# Patient Record
Sex: FEMALE | Race: WHITE | NOT HISPANIC OR LATINO | Employment: FULL TIME | URBAN - METROPOLITAN AREA
[De-identification: names, ages, dates, MRNs, and addresses within clinical notes are randomized per-mention and may not be internally consistent; named-entity substitution may affect disease eponyms.]

---

## 2020-12-08 ENCOUNTER — HOSPITAL ENCOUNTER (EMERGENCY)
Facility: HOSPITAL | Age: 50
Discharge: HOME | End: 2020-12-08
Attending: EMERGENCY MEDICINE
Payer: COMMERCIAL

## 2020-12-08 ENCOUNTER — APPOINTMENT (EMERGENCY)
Dept: RADIOLOGY | Facility: HOSPITAL | Age: 50
End: 2020-12-08
Payer: COMMERCIAL

## 2020-12-08 VITALS
OXYGEN SATURATION: 99 % | DIASTOLIC BLOOD PRESSURE: 81 MMHG | SYSTOLIC BLOOD PRESSURE: 136 MMHG | TEMPERATURE: 97.6 F | HEIGHT: 63 IN | RESPIRATION RATE: 17 BRPM | HEART RATE: 81 BPM

## 2020-12-08 DIAGNOSIS — K22.2 ESOPHAGEAL OBSTRUCTION DUE TO FOOD IMPACTION: Primary | ICD-10-CM

## 2020-12-08 DIAGNOSIS — T18.128A ESOPHAGEAL OBSTRUCTION DUE TO FOOD IMPACTION: Primary | ICD-10-CM

## 2020-12-08 LAB
ATRIAL RATE: 77 BPM
P AXIS: 31 DEGREES
PR INTERVAL: 156 MS
QRS AXIS: 48 DEGREES
QRSD INTERVAL: 76 MS
QT INTERVAL: 373 MS
QTC INTERVAL: 420 MS
T WAVE AXIS: 35 DEGREES
VENTRICULAR RATE: 76 BPM

## 2020-12-08 PROCEDURE — 99284 EMERGENCY DEPT VISIT MOD MDM: CPT

## 2020-12-08 PROCEDURE — 99285 EMERGENCY DEPT VISIT HI MDM: CPT | Performed by: EMERGENCY MEDICINE

## 2020-12-08 PROCEDURE — 96374 THER/PROPH/DIAG INJ IV PUSH: CPT

## 2020-12-08 PROCEDURE — 93010 ELECTROCARDIOGRAM REPORT: CPT | Performed by: INTERNAL MEDICINE

## 2020-12-08 PROCEDURE — 71046 X-RAY EXAM CHEST 2 VIEWS: CPT

## 2020-12-08 PROCEDURE — 93005 ELECTROCARDIOGRAM TRACING: CPT

## 2020-12-08 RX ADMIN — GLUCAGON HYDROCHLORIDE 1 MG: KIT at 16:14

## 2021-01-04 ENCOUNTER — OFFICE VISIT (OUTPATIENT)
Dept: GASTROENTEROLOGY | Facility: CLINIC | Age: 51
End: 2021-01-04
Payer: COMMERCIAL

## 2021-01-04 VITALS
WEIGHT: 167 LBS | HEIGHT: 63 IN | DIASTOLIC BLOOD PRESSURE: 84 MMHG | SYSTOLIC BLOOD PRESSURE: 131 MMHG | BODY MASS INDEX: 29.59 KG/M2 | HEART RATE: 76 BPM | TEMPERATURE: 97.2 F

## 2021-01-04 DIAGNOSIS — R13.10 DYSPHAGIA, UNSPECIFIED TYPE: Primary | ICD-10-CM

## 2021-01-04 PROCEDURE — 99203 OFFICE O/P NEW LOW 30 MIN: CPT | Performed by: PHYSICIAN ASSISTANT

## 2021-01-04 NOTE — PROGRESS NOTES
[facility] - Outpatient Follow-up Note  Willadean Apley 52 y o  female MRN: [id number]  Encounter: [id number]          ASSESSMENT AND PLAN:      Diagnoses and all orders for this visit:    Dysphagia, unspecified type  -     EGD; Future    Other orders  -     Diet NPO; Sips with meds; Standing  -     Void on call to OR; Standing  -     Insert peripheral IV; Standing    Patient with intermittent episodes of dysphagia over several years  Will plan for EGD +/- dilation to evaluate for mechanical cause like web/ring/stricture or evidence of esophagitis  If EGD is normal, could consider barium swallow and esophageal manometry  Discussed plan with patient who is in agreement with this  All questions were answered  Will follow up after procedure  Patient is aware to return to ED with symptoms suggestive of obstruction including inability to swallow secretions  ______________________________________________________________________    Ashanti Solis is a 61-year-old female who presents to the GI office for follow up to an ER visit on 12/8/2020 for food bolus impaction  She reports a history of intermittent dysphagia with episodes every few years  Her last EGD with dilation was performed in 2015 by Dr Dwight Lopez at [facility]   She states she also had an episode of dysphagia with impaction 2 years ago which required emergent EGD for removal   She denies regular symptoms of dysphagia between these episodes  Her most recent episode on 12/8/2020 occurred while at work  She states she had eaten grapes over her break and 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 began to experience chest pain and shortness of breath  She felt pressure in the mid-esophagus  She went to the ED for evaluation  Chest X-ray was negative for acute process and remainder of workup was reportedly negative  Her symptoms ultimately resolved spontaneously and she did not require EGD    Since that time, she has been limited to a soft food or liquid diet as she fears a repeat episode  No reported fevers, chills, night sweats or weight loss  She denies heartburn or indigestion  She does not take any medications daily, and takes only a multivitamin  No further chest pain or shortness of breath  REVIEW OF SYSTEMS IS OTHERWISE NEGATIVE  Historical Information   Past Medical History:   Diagnosis Date    GERD (gastroesophageal reflux disease)     History of breast problem      Past Surgical History:   Procedure Laterality Date    BASAL CELL CARCINOMA EXCISION  2018    Dr Rj Womack (HISTORICAL)  2017    Mammo/U/S 11/2017 stable; next mammo 1 year    REVISION OF SCAR  09/2019    Excision of Painful scar Right retroauricular area - Dr Asher Perrin History   Social History     Substance and Sexual Activity   Alcohol Use Never    Frequency: Never     Social History     Substance and Sexual Activity   Drug Use Never     Social History     Tobacco Use   Smoking Status Never Smoker   Smokeless Tobacco Never Used     History reviewed  No pertinent family history  Meds/Allergies     No current outpatient medications on file  No Known Allergies        Objective     Blood pressure 131/84, pulse 76, temperature (!) 97 2 °F (36 2 °C), temperature source Tympanic, height 5' 3" (1 6 m), weight 75 8 kg (167 lb)  Body mass index is 29 58 kg/m²  PHYSICAL EXAM:      General Appearance:   Alert, cooperative, no distress   HEENT:   Normocephalic, atraumatic, sclera anicteric  Patient is wearing a mask over nose and mouth     Neck:  Supple, symmetrical, no lymphadenopathy, trachea midline   Lungs:   Clear to auscultation bilaterally; no rales, rhonchi or wheezing; respirations unlabored    Heart[de-identified]   Regular rate and rhythm; no murmur, rub, or gallop     Abdomen:   Soft, non-tender without rebound or guarding, non-distended; positive bowel sounds; no masses, no organomegaly    Genitalia:   Deferred    Rectal:   Deferred    Extremities:  No cyanosis, clubbing or edema    Pulses:  2+ and symmetric    Skin:  No jaundice, rashes, or lesions        Radiology Results:   Xr Chest 2 Views    Result Date: 12/8/2020  Impression: No acute cardiopulmonary disease   Workstation performed: [id number]       Tiffany Lucero PA-C

## 2021-01-04 NOTE — PATIENT INSTRUCTIONS
Egd on 1/25/21 with Dr Indu Gloria at [facility]  Prep instructions given to the patient in the office

## 2021-01-11 ENCOUNTER — ANESTHESIA EVENT (OUTPATIENT)
Dept: GASTROENTEROLOGY | Facility: AMBULARY SURGERY CENTER | Age: 51
End: 2021-01-11

## 2021-01-25 ENCOUNTER — HOSPITAL ENCOUNTER (OUTPATIENT)
Dept: GASTROENTEROLOGY | Facility: AMBULARY SURGERY CENTER | Age: 51
Setting detail: OUTPATIENT SURGERY
Discharge: HOME | End: 2021-01-25
Payer: COMMERCIAL

## 2021-01-25 ENCOUNTER — ANESTHESIA (OUTPATIENT)
Dept: GASTROENTEROLOGY | Facility: AMBULARY SURGERY CENTER | Age: 51
End: 2021-01-25

## 2021-01-25 VITALS
WEIGHT: 158 LBS | BODY MASS INDEX: 28 KG/M2 | HEART RATE: 73 BPM | SYSTOLIC BLOOD PRESSURE: 145 MMHG | RESPIRATION RATE: 16 BRPM | HEIGHT: 63 IN | OXYGEN SATURATION: 100 % | TEMPERATURE: 96.8 F | DIASTOLIC BLOOD PRESSURE: 77 MMHG

## 2021-01-25 VITALS — HEART RATE: 87 BPM

## 2021-01-25 DIAGNOSIS — R13.10 DYSPHAGIA, UNSPECIFIED TYPE: ICD-10-CM

## 2021-01-25 PROCEDURE — 88342 IMHCHEM/IMCYTCHM 1ST ANTB: CPT | Performed by: PATHOLOGY

## 2021-01-25 PROCEDURE — 88305 TISSUE EXAM BY PATHOLOGIST: CPT | Performed by: PATHOLOGY

## 2021-01-25 PROCEDURE — 43239 EGD BIOPSY SINGLE/MULTIPLE: CPT | Performed by: INTERNAL MEDICINE

## 2021-01-25 PROCEDURE — 88312 SPECIAL STAINS GROUP 1: CPT | Performed by: PATHOLOGY

## 2021-01-25 PROCEDURE — 43249 ESOPH EGD DILATION <30 MM: CPT | Performed by: INTERNAL MEDICINE

## 2021-01-25 PROCEDURE — C1726 CATH, BAL DIL, NON-VASCULAR: HCPCS

## 2021-01-25 RX ORDER — LIDOCAINE HYDROCHLORIDE 10 MG/ML
INJECTION, SOLUTION EPIDURAL; INFILTRATION; INTRACAUDAL; PERINEURAL AS NEEDED
Status: DISCONTINUED | OUTPATIENT
Start: 2021-01-25 | End: 2021-01-25

## 2021-01-25 RX ORDER — MULTIVITAMIN
1 TABLET ORAL DAILY
COMMUNITY

## 2021-01-25 RX ORDER — SODIUM CHLORIDE, SODIUM LACTATE, POTASSIUM CHLORIDE, CALCIUM CHLORIDE 600; 310; 30; 20 MG/100ML; MG/100ML; MG/100ML; MG/100ML
125 INJECTION, SOLUTION INTRAVENOUS CONTINUOUS
Status: DISCONTINUED | OUTPATIENT
Start: 2021-01-25 | End: 2021-01-29 | Stop reason: HOSPADM

## 2021-01-25 RX ORDER — KETAMINE HYDROCHLORIDE 50 MG/ML
INJECTION, SOLUTION, CONCENTRATE INTRAMUSCULAR; INTRAVENOUS AS NEEDED
Status: DISCONTINUED | OUTPATIENT
Start: 2021-01-25 | End: 2021-01-25

## 2021-01-25 RX ORDER — PROPOFOL 10 MG/ML
INJECTION, EMULSION INTRAVENOUS AS NEEDED
Status: DISCONTINUED | OUTPATIENT
Start: 2021-01-25 | End: 2021-01-25

## 2021-01-25 RX ADMIN — SODIUM CHLORIDE, SODIUM LACTATE, POTASSIUM CHLORIDE, AND CALCIUM CHLORIDE: .6; .31; .03; .02 INJECTION, SOLUTION INTRAVENOUS at 12:36

## 2021-01-25 RX ADMIN — PROPOFOL 120 MG: 10 INJECTION, EMULSION INTRAVENOUS at 12:41

## 2021-01-25 RX ADMIN — LIDOCAINE HYDROCHLORIDE 50 MG: 10 INJECTION, SOLUTION EPIDURAL; INFILTRATION; INTRACAUDAL at 12:41

## 2021-01-25 RX ADMIN — PROPOFOL 50 MG: 10 INJECTION, EMULSION INTRAVENOUS at 12:47

## 2021-01-25 RX ADMIN — PROPOFOL 40 MG: 10 INJECTION, EMULSION INTRAVENOUS at 12:44

## 2021-01-25 RX ADMIN — PROPOFOL 50 MG: 10 INJECTION, EMULSION INTRAVENOUS at 12:49

## 2021-01-25 RX ADMIN — KETAMINE HYDROCHLORIDE 20 MG: 50 INJECTION INTRAMUSCULAR; INTRAVENOUS at 12:47

## 2021-01-25 NOTE — ANESTHESIA PREPROCEDURE EVALUATION
Procedure:  EGD    Relevant Problems   GI/HEPATIC   (+) GERD (gastroesophageal reflux disease)        Physical Exam    Airway    Mallampati score: II  TM Distance: >3 FB  Neck ROM: full     Dental   Comment: Upper partial, upper dentures,     Cardiovascular  Cardiovascular exam normal    Pulmonary  Pulmonary exam normal     Other Findings        Anesthesia Plan  ASA Score- 2     Anesthesia Type- IV sedation with anesthesia with ASA Monitors  Additional Monitors:   Airway Plan:           Plan Factors-Exercise tolerance (METS): >4 METS  Chart reviewed  Patient is not a current smoker  Patient not instructed to abstain from smoking on day of procedure  Patient did not smoke on day of surgery  Induction- intravenous  Postoperative Plan-     Informed Consent- Anesthetic plan and risks discussed with patient  I personally reviewed this patient with the CRNA  Discussed and agreed on the Anesthesia Plan with the CRNA  Dillon Rouse

## 2021-01-25 NOTE — ANESTHESIA POSTPROCEDURE EVALUATION
Post-Op Assessment Note    CV Status:  Stable    Pain management: adequate     Mental Status:  Alert and awake   Hydration Status:  Euvolemic   PONV Controlled:  Controlled   Airway Patency:  Patent      Post Op Vitals Reviewed: Yes      Staff: CRNA         No complications documented      /76 (01/25/21 1300)    Temp 98 1 °F (36 7 °C) (01/25/21 1255)    Pulse 79 (01/25/21 1300)   Resp 16 (01/25/21 1300)    SpO2 99 % (01/25/21 1300)

## 2021-01-25 NOTE — PROGRESS NOTES
Pt  Currently on her menstrual cycle and unable to provide clean urine for urine pregnancy test  EGD to be done today  Per pt  Her  had vasectomy and there is no chance of pregnancy  Dr Brooke Wheat with anesthesia made aware  Was advised pt  Does not need pregnancy test for today's procedure

## 2021-01-25 NOTE — H&P
History and Physical -  [facility]  Jaime Barajas 52 y o  female MRN: [id number]                  HPI: Jaime Barajas is a 52y o  year old female who presents for EGD  REVIEW OF SYSTEMS: Per the HPI, and otherwise unremarkable  Historical Information   Past Medical History:   Diagnosis Date    GERD (gastroesophageal reflux disease)     History of breast problem      Past Surgical History:   Procedure Laterality Date    BASAL CELL CARCINOMA EXCISION  2018    Dr Dalila Nicholas (HISTORICAL)  2017    Mammo/U/S 11/2017 stable; next mammo 1 year    REVISION OF SCAR  09/2019    Excision of Painful scar Right retroauricular area - Dr Luann Oscar History   Social History     Substance and Sexual Activity   Alcohol Use Never    Frequency: Never     Social History     Substance and Sexual Activity   Drug Use Never     Social History     Tobacco Use   Smoking Status Never Smoker   Smokeless Tobacco Never Used     History reviewed  No pertinent family history  Meds/Allergies       Current Outpatient Medications:     Multiple Vitamin (multivitamin) tablet    Current Facility-Administered Medications:     lactated ringers infusion, 125 mL/hr, Intravenous, Continuous    No Known Allergies    Objective     /71   Pulse 73   Temp (!) 97 2 °F (36 2 °C) (Temporal)   Resp 18   Ht 5' 3" (1 6 m)   Wt 71 7 kg (158 lb)   LMP 01/25/2021   SpO2 99%   BMI 27 99 kg/m²       PHYSICAL EXAM    Gen: NAD  CV: RRR  CHEST: Clear  ABD: soft, NT/ND  EXT: no edema      ASSESSMENT/PLAN:  This is a 52y o  year old female here for EGD, and she is stable and optimized for her procedure

## 2021-01-29 ENCOUNTER — TELEPHONE (OUTPATIENT)
Dept: GASTROENTEROLOGY | Facility: CLINIC | Age: 51
End: 2021-01-29

## 2021-01-29 DIAGNOSIS — A04.8 HELICOBACTER PYLORI (H. PYLORI) INFECTION: Primary | ICD-10-CM

## 2021-01-29 RX ORDER — OMEPRAZOLE 40 MG/1
40 CAPSULE, DELAYED RELEASE ORAL 2 TIMES DAILY
Qty: 14 CAPSULE | Refills: 0 | Status: SHIPPED | OUTPATIENT
Start: 2021-01-29 | End: 2021-06-01

## 2021-01-29 RX ORDER — AMOXICILLIN 500 MG/1
1000 CAPSULE ORAL EVERY 12 HOURS SCHEDULED
Qty: 56 CAPSULE | Refills: 0 | Status: SHIPPED | OUTPATIENT
Start: 2021-01-29 | End: 2021-02-12

## 2021-01-29 NOTE — TELEPHONE ENCOUNTER
----- Message from Pamela Robles DO sent at 1/29/2021  1:53 PM EST -----  Please send pt a letter telling her to call us as her phone number is incorrect in our system and I need to give her the results of her EGD

## 2021-01-29 NOTE — RESULT ENCOUNTER NOTE
Please send pt a letter telling her to call us as her phone number is incorrect in our system and I need to give her the results of her EGD

## 2021-01-29 NOTE — TELEPHONE ENCOUNTER
I called and left  for Parth Son to have his wife give us a call and to update phone number, Dr Joanna Garcia was calling pt to go over results

## 2021-01-29 NOTE — RESULT ENCOUNTER NOTE
Discussed with pt that her biopsies show H pylori  I will call in a two week course of antibotics for this  She has no allergies  Will treat with triple therapy for 2 weeks and then have her get a h pylori stool test two weeks after that  Pt is aware  Sherita will you please get her office follow up or virtual visit in about one month so we can re-go over everything as I will also need to get her on treatment for eosinophilic esophagitis

## 2021-02-03 ENCOUNTER — TELEPHONE (OUTPATIENT)
Dept: GASTROENTEROLOGY | Facility: CLINIC | Age: 51
End: 2021-02-03

## 2021-02-03 NOTE — TELEPHONE ENCOUNTER
----- Message from Jailyn Fisher DO sent at 1/29/2021  3:23 PM EST -----  Discussed with pt that her biopsies show H pylori  I will call in a two week course of antibotics for this  She has no allergies  Will treat with triple therapy for 2 weeks and then have her get a h pylori stool test two weeks after that  Pt is aware  Sherita will you please get her office follow up or virtual visit in about one month so we can re-go over everything as I will also need to get her on treatment for eosinophilic esophagitis

## 2021-03-10 DIAGNOSIS — Z23 ENCOUNTER FOR IMMUNIZATION: ICD-10-CM

## 2021-03-26 ENCOUNTER — TELEMEDICINE (OUTPATIENT)
Dept: GASTROENTEROLOGY | Facility: CLINIC | Age: 51
End: 2021-03-26
Payer: COMMERCIAL

## 2021-03-26 DIAGNOSIS — B96.81 HELICOBACTER POSITIVE GASTRITIS: Primary | ICD-10-CM

## 2021-03-26 DIAGNOSIS — K20.0 EOSINOPHILIC ESOPHAGITIS: ICD-10-CM

## 2021-03-26 DIAGNOSIS — K21.9 GASTROESOPHAGEAL REFLUX DISEASE WITHOUT ESOPHAGITIS: ICD-10-CM

## 2021-03-26 DIAGNOSIS — K29.70 HELICOBACTER POSITIVE GASTRITIS: Primary | ICD-10-CM

## 2021-03-26 PROCEDURE — 99214 OFFICE O/P EST MOD 30 MIN: CPT | Performed by: INTERNAL MEDICINE

## 2021-03-26 RX ORDER — ESOMEPRAZOLE MAGNESIUM 40 MG/1
40 FOR SUSPENSION ORAL
Qty: 60 EACH | Refills: 3 | Status: SHIPPED | OUTPATIENT
Start: 2021-03-26

## 2021-03-26 NOTE — PROGRESS NOTES
Virtual Regular Visit      Assessment/Plan:    Problem List Items Addressed This Visit        Digestive    GERD (gastroesophageal reflux disease)      Other Visit Diagnoses     Helicobacter positive gastritis    -  Primary    Eosinophilic esophagitis            1   H pylori gastritis  -  She completed treatment,  I recommend she check the stool antigen after holding her PPI for 2 weeks  2    Eosinophilic esophagitis  - will repeat EGD in 3 months after     4 months follow-up       Reason for visit is   Chief Complaint   Patient presents with    Virtual Regular Visit        Encounter provider Silvano Russell DO    Provider located at [address] [place] [address] 1  [address] [address]  [phone]      Recent Visits  No visits were found meeting these conditions  Showing recent visits within past 7 days and meeting all other requirements     Future Appointments  No visits were found meeting these conditions  Showing future appointments within next 150 days and meeting all other requirements      The patient was identified by name and date of birth  Layla Greene was informed that this is a telemedicine visit and that the visit is being conducted through telephone  My office door was closed  No one else was in the room  She acknowledged consent and understanding of privacy and security of the video platform  The patient has agreed to participate and understands they can discontinue the visit at any time  Patient is aware this is a billable service  Subjective  Layla Greene is a 54 y o  female here for follow up of H pylori gastritis  She also has new diagnosis of EoE and is on prilosec 40 mg bid  Swallowing is much improved currently              Past Medical History:   Diagnosis Date    GERD (gastroesophageal reflux disease)     History of breast problem        Past Surgical History:   Procedure Laterality Date    BASAL CELL CARCINOMA EXCISION  2018    Dr Gil Franklin (HISTORICAL)  2017    Mammo/U/S 11/2017 stable; next mammo 1 year    REVISION OF SCAR  09/2019    Excision of Painful scar Right retroauricular area - Dr Marlee Reyes         Current Outpatient Medications   Medication Sig Dispense Refill    Multiple Vitamin (multivitamin) tablet Take 1 tablet by mouth daily      omeprazole (PriLOSEC) 40 MG capsule Take 1 capsule (40 mg total) by mouth 2 (two) times a day 14 capsule 0     No current facility-administered medications for this visit  No Known Allergies    REVIEW OF SYSTEMS:    CONSTITUTIONAL: Denies any fever, chills, rigors, and weight loss  HEENT: No earache or tinnitus  Denies hearing loss or visual disturbances  CARDIOVASCULAR: No chest pain or palpitations  RESPIRATORY: Denies any cough, hemoptysis, shortness of breath or dyspnea on exertion  GASTROINTESTINAL: As noted in the History of Present Illness  GENITOURINARY: No problems with urination  Denies any hematuria or dysuria  NEUROLOGIC: No dizziness or vertigo, denies headaches  MUSCULOSKELETAL: Denies any muscle or joint pain  SKIN: Denies skin rashes or itching  ENDOCRINE: Denies excessive thirst  Denies intolerance to heat or cold  PSYCHOSOCIAL: Denies depression or anxiety  Denies any recent memory loss  PHYSICAL EXAMINATION:    General Appearance:   Not done   HEENT:     Lungs:      Cardiovascular:      Abdomen:      Skin:      Musculoskeletal:      Psych:     Neuro: I spent 7 minutes directly with the patient during this visit      VIRTUAL VISIT DISCLAIMER    Luis Felipe Sweeney acknowledges that she has consented to an online visit or consultation   She understands that the online visit is based solely on information provided by her, and that, in the absence of a face-to-face physical evaluation by the physician, the diagnosis she receives is both limited and provisional in terms of accuracy and completeness  This is not intended to replace a full medical face-to-face evaluation by the physician  Do Wesley understands and accepts these terms

## 2021-03-29 DIAGNOSIS — Z12.11 COLON CANCER SCREENING: Primary | ICD-10-CM

## 2021-03-30 ENCOUNTER — PREP FOR PROCEDURE (OUTPATIENT)
Dept: GASTROENTEROLOGY | Facility: CLINIC | Age: 51
End: 2021-03-30

## 2021-03-30 DIAGNOSIS — Z12.11 COLON CANCER SCREENING: Primary | ICD-10-CM

## 2021-03-30 RX ORDER — SODIUM, POTASSIUM,MAG SULFATES 17.5-3.13G
SOLUTION, RECONSTITUTED, ORAL ORAL
Qty: 366 ML | Refills: 0 | Status: SHIPPED | OUTPATIENT
Start: 2021-03-30

## 2021-03-30 NOTE — TELEPHONE ENCOUNTER
Colon/egd on 6/1/21 with Dr Ayoub Or at [facility]  While speaking to patient stated her family doctor has ordered a colonoscopy  Asked the OA questions and were all no  Suprep instructions gone over verbally and mailed to the patient

## 2021-04-27 ENCOUNTER — TRANSCRIBE ORDERS (OUTPATIENT)
Dept: LAB | Facility: CLINIC | Age: 51
End: 2021-04-27

## 2021-05-05 ENCOUNTER — APPOINTMENT (OUTPATIENT)
Dept: LAB | Facility: CLINIC | Age: 51
End: 2021-05-05
Payer: COMMERCIAL

## 2021-05-05 DIAGNOSIS — A04.8 HELICOBACTER PYLORI (H. PYLORI) INFECTION: ICD-10-CM

## 2021-05-05 PROCEDURE — 87338 HPYLORI STOOL AG IA: CPT

## 2021-05-07 LAB — H PYLORI AG STL QL IA: POSITIVE

## 2021-05-11 ENCOUNTER — TELEPHONE (OUTPATIENT)
Dept: GASTROENTEROLOGY | Facility: CLINIC | Age: 51
End: 2021-05-11

## 2021-05-11 DIAGNOSIS — A04.8 HELICOBACTER PYLORI (H. PYLORI) INFECTION: Primary | ICD-10-CM

## 2021-05-11 RX ORDER — METRONIDAZOLE 250 MG/1
250 TABLET ORAL
Qty: 56 TABLET | Refills: 0 | Status: SHIPPED | OUTPATIENT
Start: 2021-05-11 | End: 2021-05-25

## 2021-05-11 RX ORDER — OMEPRAZOLE 40 MG/1
40 CAPSULE, DELAYED RELEASE ORAL
Qty: 28 CAPSULE | Refills: 0 | Status: SHIPPED | OUTPATIENT
Start: 2021-05-11 | End: 2021-06-01

## 2021-05-11 RX ORDER — BISMUTH SUBSALICYLATE 262 MG/1
524 TABLET, CHEWABLE ORAL
Qty: 112 TABLET | Refills: 0 | Status: SHIPPED | OUTPATIENT
Start: 2021-05-11 | End: 2021-05-25

## 2021-05-11 RX ORDER — TETRACYCLINE HYDROCHLORIDE 500 MG/1
500 CAPSULE ORAL
Qty: 56 CAPSULE | Refills: 0 | Status: SHIPPED | OUTPATIENT
Start: 2021-05-11 | End: 2021-05-25

## 2021-05-11 NOTE — TELEPHONE ENCOUNTER
----- Message from Willy Lennox, PA-C sent at 5/10/2021  4:55 PM EDT -----  Kelton Magallon, I called and left her a VM  It looks like she was treated with amoxicillin, clarithromycin, and omeprazole the 1st time, but she failed treatment  She does not appear to have allergies  If you can confirm this, I will order quadruple therapy (metronidazole, tetracycline, pepto, and omeprazole x 2 weeks)  Thanks, just let me know

## 2021-05-28 ENCOUNTER — TELEPHONE (OUTPATIENT)
Dept: GASTROENTEROLOGY | Facility: AMBULARY SURGERY CENTER | Age: 51
End: 2021-05-28

## 2021-06-01 ENCOUNTER — HOSPITAL ENCOUNTER (OUTPATIENT)
Dept: GASTROENTEROLOGY | Facility: AMBULARY SURGERY CENTER | Age: 51
Setting detail: OUTPATIENT SURGERY
Discharge: HOME | End: 2021-06-01
Attending: INTERNAL MEDICINE | Admitting: INTERNAL MEDICINE
Payer: COMMERCIAL

## 2021-06-01 ENCOUNTER — ANESTHESIA EVENT (OUTPATIENT)
Dept: GASTROENTEROLOGY | Facility: AMBULARY SURGERY CENTER | Age: 51
End: 2021-06-01

## 2021-06-01 ENCOUNTER — ANESTHESIA EVENT (OUTPATIENT)
Dept: ANESTHESIOLOGY | Facility: HOSPITAL | Age: 51
End: 2021-06-01

## 2021-06-01 ENCOUNTER — ANESTHESIA (OUTPATIENT)
Dept: ANESTHESIOLOGY | Facility: HOSPITAL | Age: 51
End: 2021-06-01

## 2021-06-01 ENCOUNTER — ANESTHESIA (OUTPATIENT)
Dept: GASTROENTEROLOGY | Facility: AMBULARY SURGERY CENTER | Age: 51
End: 2021-06-01

## 2021-06-01 VITALS
DIASTOLIC BLOOD PRESSURE: 62 MMHG | HEART RATE: 79 BPM | SYSTOLIC BLOOD PRESSURE: 143 MMHG | TEMPERATURE: 96.5 F | RESPIRATION RATE: 18 BRPM | HEIGHT: 63 IN | BODY MASS INDEX: 28 KG/M2 | OXYGEN SATURATION: 99 % | WEIGHT: 158 LBS

## 2021-06-01 DIAGNOSIS — Z12.11 COLON CANCER SCREENING: ICD-10-CM

## 2021-06-01 DIAGNOSIS — K20.0 EOSINOPHILIC ESOPHAGITIS: ICD-10-CM

## 2021-06-01 PROCEDURE — 88342 IMHCHEM/IMCYTCHM 1ST ANTB: CPT | Performed by: PATHOLOGY

## 2021-06-01 PROCEDURE — G0121 COLON CA SCRN NOT HI RSK IND: HCPCS | Performed by: INTERNAL MEDICINE

## 2021-06-01 PROCEDURE — 43239 EGD BIOPSY SINGLE/MULTIPLE: CPT | Performed by: INTERNAL MEDICINE

## 2021-06-01 PROCEDURE — 88305 TISSUE EXAM BY PATHOLOGIST: CPT | Performed by: PATHOLOGY

## 2021-06-01 RX ORDER — PROPOFOL 10 MG/ML
INJECTION, EMULSION INTRAVENOUS AS NEEDED
Status: DISCONTINUED | OUTPATIENT
Start: 2021-06-01 | End: 2021-06-01

## 2021-06-01 RX ORDER — SODIUM CHLORIDE, SODIUM LACTATE, POTASSIUM CHLORIDE, CALCIUM CHLORIDE 600; 310; 30; 20 MG/100ML; MG/100ML; MG/100ML; MG/100ML
50 INJECTION, SOLUTION INTRAVENOUS CONTINUOUS
Status: CANCELLED | OUTPATIENT
Start: 2021-06-01

## 2021-06-01 RX ORDER — LIDOCAINE HYDROCHLORIDE 10 MG/ML
INJECTION, SOLUTION EPIDURAL; INFILTRATION; INTRACAUDAL; PERINEURAL AS NEEDED
Status: DISCONTINUED | OUTPATIENT
Start: 2021-06-01 | End: 2021-06-01

## 2021-06-01 RX ORDER — SODIUM CHLORIDE, SODIUM LACTATE, POTASSIUM CHLORIDE, CALCIUM CHLORIDE 600; 310; 30; 20 MG/100ML; MG/100ML; MG/100ML; MG/100ML
INJECTION, SOLUTION INTRAVENOUS CONTINUOUS PRN
Status: DISCONTINUED | OUTPATIENT
Start: 2021-06-01 | End: 2021-06-01

## 2021-06-01 RX ADMIN — SODIUM CHLORIDE, SODIUM LACTATE, POTASSIUM CHLORIDE, AND CALCIUM CHLORIDE: .6; .31; .03; .02 INJECTION, SOLUTION INTRAVENOUS at 08:34

## 2021-06-01 RX ADMIN — PROPOFOL 120 MG: 10 INJECTION, EMULSION INTRAVENOUS at 09:52

## 2021-06-01 RX ADMIN — PROPOFOL 50 MG: 10 INJECTION, EMULSION INTRAVENOUS at 10:02

## 2021-06-01 RX ADMIN — PROPOFOL 50 MG: 10 INJECTION, EMULSION INTRAVENOUS at 09:57

## 2021-06-01 RX ADMIN — PROPOFOL 50 MG: 10 INJECTION, EMULSION INTRAVENOUS at 09:59

## 2021-06-01 RX ADMIN — PROPOFOL 100 MG: 10 INJECTION, EMULSION INTRAVENOUS at 09:56

## 2021-06-01 RX ADMIN — PROPOFOL 100 MG: 10 INJECTION, EMULSION INTRAVENOUS at 10:07

## 2021-06-01 RX ADMIN — PROPOFOL 80 MG: 10 INJECTION, EMULSION INTRAVENOUS at 09:54

## 2021-06-01 RX ADMIN — LIDOCAINE HYDROCHLORIDE 50 MG: 10 INJECTION, SOLUTION EPIDURAL; INFILTRATION; INTRACAUDAL at 09:52

## 2021-06-01 RX ADMIN — PROPOFOL 50 MG: 10 INJECTION, EMULSION INTRAVENOUS at 10:05

## 2021-06-01 NOTE — H&P
History and Physical -  [facility]  Cj Lantigua 54 y o  female MRN: [id number]                  HPI: Cj Lantigua is a 54y o  year old female who presents for follow-up EGD for diagnosis of eosinophilic esophagitis as well as H pylori gastritis  Colonoscopy for screening  REVIEW OF SYSTEMS: Per the HPI, and otherwise unremarkable  Historical Information   Past Medical History:   Diagnosis Date    GERD (gastroesophageal reflux disease)     History of breast problem      Past Surgical History:   Procedure Laterality Date    BASAL CELL CARCINOMA EXCISION  2018    Dr Dexter Stack (HISTORICAL)  2017    Mammo/U/S 11/2017 stable; next mammo 1 year    REVISION OF SCAR  09/2019    Excision of Painful scar Right retroauricular area - Dr Barton Shock History   Social History     Substance and Sexual Activity   Alcohol Use Never    Frequency: Never     Social History     Substance and Sexual Activity   Drug Use Never     Social History     Tobacco Use   Smoking Status Never Smoker   Smokeless Tobacco Never Used     History reviewed  No pertinent family history  Meds/Allergies       Current Outpatient Medications:     esomeprazole (NexIUM) 40 mg packet    Multiple Vitamin (multivitamin) tablet    Na Sulfate-K Sulfate-Mg Sulf (Suprep Bowel Prep Kit) 17 5-3 13-1 6 GM/177ML SOLN    No Known Allergies    Objective     /70   Pulse 78 Comment: NSR  Temp (!) 96 4 °F (35 8 °C) (Temporal)   Resp 18   Ht 5' 3" (1 6 m)   Wt 71 7 kg (158 lb)   LMP 05/19/2021 (Approximate)   SpO2 100%   BMI 27 99 kg/m²       PHYSICAL EXAM    Gen: NAD  Head: NCAT  CV: RRR  CHEST: Clear  ABD: soft, NT/ND  EXT: no edema      ASSESSMENT/PLAN:  This is a 54y o  year old female here for EGD and colonoscopy, and she is stable and optimized for her procedure

## 2021-06-01 NOTE — ANESTHESIA PREPROCEDURE EVALUATION
Procedure:  PRE-OP ONLY    Relevant Problems   GI/HEPATIC   (+) GERD (gastroesophageal reflux disease)      EKG 12/8/2020:  Sinus rhythm    No results found for: WBC, HGB, HCT, MCV, PLT  No results found for: SODIUM, K, CL, CO2, BUN, CREATININE, GLUC, CALCIUM  No results found for: INR, PROTIME  Lab Results   Component Value Date    HGBA1C 5 2 01/04/2020               Anesthesia Plan  ASA Score- 2     Anesthesia Type- IV sedation with anesthesia with ASA Monitors  Additional Monitors:   Airway Plan:           Plan Factors-    Chart reviewed  EKG reviewed  Patient summary reviewed  Induction- intravenous      Postoperative Plan-     Informed Consent-

## 2021-06-01 NOTE — ANESTHESIA POSTPROCEDURE EVALUATION
Post-Op Assessment Note    CV Status:  Stable  Pain Score: 0    Pain management: adequate     Mental Status:  Alert and awake   Hydration Status:  Euvolemic   PONV Controlled:  Controlled   Airway Patency:  Patent      Post Op Vitals Reviewed: Yes      Staff: CRNA         No complications documented      BP   105/64   Temp     Pulse 80   Resp 16   SpO2 100

## 2021-06-01 NOTE — ANESTHESIA PREPROCEDURE EVALUATION
Procedure:  EGD  COLONOSCOPY    Relevant Problems   ANESTHESIA (within normal limits)      CARDIO (within normal limits)      ENDO (within normal limits)      GI/HEPATIC   (+) GERD (gastroesophageal reflux disease)      /RENAL (within normal limits)      HEMATOLOGY (within normal limits)      NEURO/PSYCH (within normal limits)      PULMONARY (within normal limits)      EKG 12/8/2020:  Sinus rhythm    No results found for: WBC, HGB, HCT, MCV, PLT  No results found for: SODIUM, K, CL, CO2, BUN, CREATININE, GLUC, CALCIUM  No results found for: INR, PROTIME  Lab Results   Component Value Date    HGBA1C 5 2 01/04/2020          Physical Exam    Airway    Mallampati score: II  TM Distance: >3 FB  Neck ROM: full     Dental   upper dentures,     Cardiovascular  Cardiovascular exam normal    Pulmonary  Pulmonary exam normal     Other Findings        Anesthesia Plan  ASA Score- 2     Anesthesia Type- IV sedation with anesthesia with ASA Monitors  Additional Monitors:   Airway Plan:           Plan Factors-Exercise tolerance (METS): >4 METS  Chart reviewed  EKG reviewed  Patient summary reviewed  Induction- intravenous  Postoperative Plan-     Informed Consent- Anesthetic plan and risks discussed with patient  I personally reviewed this patient with the CRNA  Discussed and agreed on the Anesthesia Plan with the CRNA  Amelia Eli

## 2021-06-16 ENCOUNTER — TELEPHONE (OUTPATIENT)
Dept: GASTROENTEROLOGY | Facility: CLINIC | Age: 51
End: 2021-06-16

## 2021-06-16 DIAGNOSIS — K20.0 EOSINOPHILIC ESOPHAGITIS: Primary | ICD-10-CM

## 2021-06-16 NOTE — TELEPHONE ENCOUNTER
I called pt  There was no answer so I left a message  She still has increased eosinophils in her esophagus so whatever medicine she is on we should switch it  Please reach out to patient and see if she is taking high-dose PPI?

## 2021-06-18 RX ORDER — FLUTICASONE PROPIONATE 220 UG/1
AEROSOL, METERED RESPIRATORY (INHALATION)
Qty: 12 G | Refills: 1 | Status: SHIPPED | OUTPATIENT
Start: 2021-06-18

## 2021-06-18 NOTE — TELEPHONE ENCOUNTER
Called and reviewed recommendations with patient  We reviewed inhaler instructions, proper oral care, avoiding PO intake for 30-60 minutes after administration   Patient verbalized understanding of instructions

## 2021-06-18 NOTE — TELEPHONE ENCOUNTER
Since the high-dose PPI is not eradicating her EOE and she says she cannot tolerate 'large pills', I'm sendig in fluticasone inhaler (inhale and swallow regimen) for 8 weeks as this is the second-line treatment  Thanks!

## 2021-06-18 NOTE — TELEPHONE ENCOUNTER
Patient called back  Advised her of increased eosinophils  She is taking nexium 40 mg BID  I advised her we needed to verify this since she has the increased eosinophil count, which means it is not working  She is questioning which medication she will be switched to  She was taking nexium powder packets because she has a difficult time swallowing large pills  I advised I am not sure the size capsules that different PPI's come in, but I believe they are not usually large pills  Patient verbalized understanding   Please send preferred PPI to pharmacy on file

## 2022-06-04 ENCOUNTER — HOSPITAL ENCOUNTER (EMERGENCY)
Facility: HOSPITAL | Age: 52
Discharge: HOME | End: 2022-06-04
Attending: SURGERY
Payer: COMMERCIAL

## 2022-06-04 ENCOUNTER — APPOINTMENT (EMERGENCY)
Dept: CT IMAGING | Facility: HOSPITAL | Age: 52
End: 2022-06-04
Payer: COMMERCIAL

## 2022-06-04 VITALS
HEART RATE: 82 BPM | TEMPERATURE: 97.6 F | WEIGHT: 168.87 LBS | RESPIRATION RATE: 16 BRPM | SYSTOLIC BLOOD PRESSURE: 130 MMHG | OXYGEN SATURATION: 98 % | DIASTOLIC BLOOD PRESSURE: 70 MMHG

## 2022-06-04 DIAGNOSIS — S20.211A CHEST WALL CONTUSION, RIGHT, INITIAL ENCOUNTER: ICD-10-CM
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
## 2022-06-04 PROBLEM — R55 SYNCOPE: Status: ACTIVE | Noted: 2022-06-04

## 2022-06-04 LAB
ABO GROUP BLD: NORMAL
ANION GAP SERPL CALCULATED.3IONS-SCNC: 7 MMOL/L (ref 4–13)
BASE EXCESS BLDA CALC-SCNC: -5 MMOL/L (ref -2–3)
BUN SERPL-MCNC: 10 MG/DL (ref 5–25)
CA-I BLD-SCNC: 0.87 MMOL/L (ref 1.12–1.32)
CALCIUM SERPL-MCNC: 8.5 MG/DL (ref 8.4–10.2)
CHLORIDE SERPL-SCNC: 107 MMOL/L (ref 96–108)
CO2 SERPL-SCNC: 24 MMOL/L (ref 21–32)
CREAT SERPL-MCNC: 0.75 MG/DL (ref 0.6–1.3)
ERYTHROCYTE [DISTWIDTH] IN BLOOD BY AUTOMATED COUNT: 12.7 % (ref 11.6–15.1)
GFR SERPL CREATININE-BSD FRML MDRD: 93 ML/MIN/1.73SQ M
GLUCOSE SERPL-MCNC: 79 MG/DL (ref 65–140)
GLUCOSE SERPL-MCNC: 99 MG/DL (ref 65–140)
HCO3 BLDA-SCNC: 17.9 MMOL/L (ref 24–30)
HCT VFR BLD AUTO: 39.4 % (ref 34.8–46.1)
HCT VFR BLD CALC: 28 % (ref 34.8–46.1)
HGB BLD-MCNC: 13 G/DL (ref 11.5–15.4)
HGB BLDA-MCNC: 9.5 G/DL (ref 11.5–15.4)
MCH RBC QN AUTO: 30 PG (ref 26.8–34.3)
MCHC RBC AUTO-ENTMCNC: 33 G/DL (ref 31.4–37.4)
MCV RBC AUTO: 91 FL (ref 82–98)
PCO2 BLD: 19 MMOL/L (ref 21–32)
PCO2 BLD: 24.7 MM HG (ref 42–50)
PH BLD: 7.47 [PH] (ref 7.3–7.4)
PLATELET # BLD AUTO: 320 THOUSANDS/UL (ref 149–390)
PMV BLD AUTO: 10.8 FL (ref 8.9–12.7)
PO2 BLD: 53 MM HG (ref 35–45)
POTASSIUM BLD-SCNC: 3.1 MMOL/L (ref 3.5–5.3)
POTASSIUM SERPL-SCNC: 3.8 MMOL/L (ref 3.5–5.3)
RBC # BLD AUTO: 4.34 MILLION/UL (ref 3.81–5.12)
RH BLD: POSITIVE
SAO2 % BLD FROM PO2: 90 % (ref 60–85)
SODIUM BLD-SCNC: 145 MMOL/L (ref 136–145)
SODIUM SERPL-SCNC: 138 MMOL/L (ref 135–147)
SPECIMEN SOURCE: ABNORMAL
WBC # BLD AUTO: 7.38 THOUSAND/UL (ref 4.31–10.16)

## 2022-06-04 PROCEDURE — 72125 CT NECK SPINE W/O DYE: CPT

## 2022-06-04 PROCEDURE — 70450 CT HEAD/BRAIN W/O DYE: CPT

## 2022-06-04 PROCEDURE — 80048 BASIC METABOLIC PNL TOTAL CA: CPT | Performed by: PHYSICIAN ASSISTANT

## 2022-06-04 PROCEDURE — 84132 ASSAY OF SERUM POTASSIUM: CPT

## 2022-06-04 PROCEDURE — 99285 EMERGENCY DEPT VISIT HI MDM: CPT

## 2022-06-04 PROCEDURE — 76705 ECHO EXAM OF ABDOMEN: CPT | Performed by: PHYSICIAN ASSISTANT

## 2022-06-04 PROCEDURE — 71250 CT THORAX DX C-: CPT

## 2022-06-04 PROCEDURE — 82947 ASSAY GLUCOSE BLOOD QUANT: CPT

## 2022-06-04 PROCEDURE — 76604 US EXAM CHEST: CPT | Performed by: PHYSICIAN ASSISTANT

## 2022-06-04 PROCEDURE — 82803 BLOOD GASES ANY COMBINATION: CPT

## 2022-06-04 PROCEDURE — 93308 TTE F-UP OR LMTD: CPT | Performed by: PHYSICIAN ASSISTANT

## 2022-06-04 PROCEDURE — 82330 ASSAY OF CALCIUM: CPT

## 2022-06-04 PROCEDURE — 96365 THER/PROPH/DIAG IV INF INIT: CPT

## 2022-06-04 PROCEDURE — NC001 PR NO CHARGE: Performed by: EMERGENCY MEDICINE

## 2022-06-04 PROCEDURE — 84295 ASSAY OF SERUM SODIUM: CPT

## 2022-06-04 PROCEDURE — 85014 HEMATOCRIT: CPT

## 2022-06-04 PROCEDURE — 74176 CT ABD & PELVIS W/O CONTRAST: CPT

## 2022-06-04 PROCEDURE — 36415 COLL VENOUS BLD VENIPUNCTURE: CPT | Performed by: SURGERY

## 2022-06-04 PROCEDURE — 99204 OFFICE O/P NEW MOD 45 MIN: CPT | Performed by: SURGERY

## 2022-06-04 PROCEDURE — 85027 COMPLETE CBC AUTOMATED: CPT | Performed by: PHYSICIAN ASSISTANT

## 2022-06-04 RX ORDER — IBUPROFEN 200 MG
400 TABLET ORAL EVERY 6 HOURS PRN
Qty: 30 TABLET | Refills: 0
Start: 2022-06-04 | End: 2022-07-04

## 2022-06-04 RX ORDER — ONDANSETRON 2 MG/ML
4 INJECTION INTRAMUSCULAR; INTRAVENOUS EVERY 4 HOURS PRN
Status: DISCONTINUED | OUTPATIENT
Start: 2022-06-04 | End: 2022-06-04 | Stop reason: HOSPADM

## 2022-06-04 RX ORDER — ACETAMINOPHEN 325 MG/1
650 TABLET ORAL EVERY 4 HOURS PRN
Status: DISCONTINUED | OUTPATIENT
Start: 2022-06-04 | End: 2022-06-04 | Stop reason: HOSPADM

## 2022-06-04 RX ORDER — ACETAMINOPHEN 325 MG/1
650 TABLET ORAL EVERY 4 HOURS PRN
Refills: 0
Start: 2022-06-04

## 2022-06-04 RX ORDER — OXYCODONE HYDROCHLORIDE 5 MG/1
2.5 TABLET ORAL EVERY 4 HOURS PRN
Status: DISCONTINUED | OUTPATIENT
Start: 2022-06-04 | End: 2022-06-04 | Stop reason: HOSPADM

## 2022-06-04 RX ORDER — SODIUM CHLORIDE, SODIUM GLUCONATE, SODIUM ACETATE, POTASSIUM CHLORIDE, MAGNESIUM CHLORIDE, SODIUM PHOSPHATE, DIBASIC, AND POTASSIUM PHOSPHATE .53; .5; .37; .037; .03; .012; .00082 G/100ML; G/100ML; G/100ML; G/100ML; G/100ML; G/100ML; G/100ML
1000 INJECTION, SOLUTION INTRAVENOUS ONCE
Status: COMPLETED | OUTPATIENT
Start: 2022-06-04 | End: 2022-06-04

## 2022-06-04 RX ORDER — OXYCODONE HYDROCHLORIDE 5 MG/1
5 TABLET ORAL EVERY 4 HOURS PRN
Status: DISCONTINUED | OUTPATIENT
Start: 2022-06-04 | End: 2022-06-04 | Stop reason: HOSPADM

## 2022-06-04 RX ADMIN — SODIUM CHLORIDE, SODIUM GLUCONATE, SODIUM ACETATE, POTASSIUM CHLORIDE, MAGNESIUM CHLORIDE, SODIUM PHOSPHATE, DIBASIC, AND POTASSIUM PHOSPHATE 1000 ML: .53; .5; .37; .037; .03; .012; .00082 INJECTION, SOLUTION INTRAVENOUS at 12:37

## 2022-06-04 NOTE — QUICK NOTE
Cervical Collar Clearance: The patient had a CT scan of the cervical spine demonstrating no acute injury  On exam, the patient had no midline point tenderness or paresthesias/numbness/weakness in the extremities  The patient had full range of motion (was then able to flex, extend, and rotate head laterally) without pain  There were no distracting injuries and the patient was not intoxicated  The patient's cervical spine was cleared radiologically and clinically  Cervical collar removed at this time       Janusz Pickard PA-C  6/4/2022 01:06 PM
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
## 2022-06-04 NOTE — INCIDENTAL FINDINGS
The following findings require follow up:  Radiographic finding     Findings and Follow up required:       1) Borderline ectatic (enlarged) ascending aorta measuring 4 cm  Recommendation is follow-up with primary care provider to review the finding and for surveillance with repeat noncontrast CT scan of the chest in 1 year  2)  Tiny calcified right middle lobe granuloma  A malignancy (cancer) is unlikely, but cannot be completely excluded based on this trauma imaging  Recommend outpatient follow-up with primary care provider to review the finding and for surveillance or further workup as indicated  I personally discussed the above-noted incidental findings with the patient who expressed understanding of the findings and the recommended follow-up  Follow up should be done within 2 week(s)    Please notify the following clinician to assist with the follow up:    Ester Larkin PA-C
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
## 2022-06-07 ENCOUNTER — TELEPHONE (OUTPATIENT)
Dept: OTHER | Facility: OTHER | Age: 52
End: 2022-06-07

## 2022-06-07 NOTE — TELEPHONE ENCOUNTER
Patient's spouse called in this evening requesting patient's imaging records be sent to PCP [facility])  Advised patient to contact medical records on 6/8 to get consent form completed and returned to send information to PCP  Patient's My Chart is set up on other account, so another My Chart account could not be set up for patient to access test results
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
## 2023-07-17 ENCOUNTER — APPOINTMENT (EMERGENCY)
Dept: RADIOLOGY | Facility: HOSPITAL | Age: 53
End: 2023-07-17
Payer: COMMERCIAL

## 2023-07-17 ENCOUNTER — HOSPITAL ENCOUNTER (EMERGENCY)
Facility: HOSPITAL | Age: 53
Discharge: HOME | End: 2023-07-17
Attending: EMERGENCY MEDICINE | Admitting: EMERGENCY MEDICINE
Payer: COMMERCIAL

## 2023-07-17 VITALS
WEIGHT: 182.54 LBS | BODY MASS INDEX: 32.34 KG/M2 | OXYGEN SATURATION: 99 % | HEART RATE: 65 BPM | SYSTOLIC BLOOD PRESSURE: 117 MMHG | RESPIRATION RATE: 16 BRPM | TEMPERATURE: 97.5 F | DIASTOLIC BLOOD PRESSURE: 66 MMHG

## 2023-07-17 DIAGNOSIS — R07.9 CHEST PAIN: Primary | ICD-10-CM

## 2023-07-17 PROBLEM — I71.9 AORTIC ANEURYSM (HCC): Status: ACTIVE | Noted: 2023-07-17

## 2023-07-17 LAB
2HR DELTA HS TROPONIN: >0 NG/L
ALBUMIN SERPL BCP-MCNC: 4.2 G/DL (ref 3.5–5)
ALP SERPL-CCNC: 66 U/L (ref 34–104)
ALT SERPL W P-5'-P-CCNC: 11 U/L (ref 7–52)
ANION GAP SERPL CALCULATED.3IONS-SCNC: 8 MMOL/L
AST SERPL W P-5'-P-CCNC: 15 U/L (ref 13–39)
ATRIAL RATE: 77 BPM
BASOPHILS # BLD AUTO: 0.03 THOUSANDS/ÂΜL (ref 0–0.1)
BASOPHILS NFR BLD AUTO: 0 % (ref 0–1)
BILIRUB SERPL-MCNC: 0.34 MG/DL (ref 0.2–1)
BUN SERPL-MCNC: 11 MG/DL (ref 5–25)
CALCIUM SERPL-MCNC: 9 MG/DL (ref 8.4–10.2)
CARDIAC TROPONIN I PNL SERPL HS: 2 NG/L
CARDIAC TROPONIN I PNL SERPL HS: <2 NG/L
CHLORIDE SERPL-SCNC: 104 MMOL/L (ref 96–108)
CO2 SERPL-SCNC: 26 MMOL/L (ref 21–32)
CREAT SERPL-MCNC: 0.79 MG/DL (ref 0.6–1.3)
EOSINOPHIL # BLD AUTO: 0.26 THOUSAND/ÂΜL (ref 0–0.61)
EOSINOPHIL NFR BLD AUTO: 3 % (ref 0–6)
ERYTHROCYTE [DISTWIDTH] IN BLOOD BY AUTOMATED COUNT: 15.5 % (ref 11.6–15.1)
GFR SERPL CREATININE-BSD FRML MDRD: 86 ML/MIN/1.73SQ M
GLUCOSE SERPL-MCNC: 104 MG/DL (ref 65–140)
HCT VFR BLD AUTO: 35.1 % (ref 34.8–46.1)
HGB BLD-MCNC: 11 G/DL (ref 11.5–15.4)
IMM GRANULOCYTES # BLD AUTO: 0.02 THOUSAND/UL (ref 0–0.2)
IMM GRANULOCYTES NFR BLD AUTO: 0 % (ref 0–2)
LYMPHOCYTES # BLD AUTO: 1.92 THOUSANDS/ÂΜL (ref 0.6–4.47)
LYMPHOCYTES NFR BLD AUTO: 21 % (ref 14–44)
MCH RBC QN AUTO: 25.3 PG (ref 26.8–34.3)
MCHC RBC AUTO-ENTMCNC: 31.3 G/DL (ref 31.4–37.4)
MCV RBC AUTO: 81 FL (ref 82–98)
MONOCYTES # BLD AUTO: 0.49 THOUSAND/ÂΜL (ref 0.17–1.22)
MONOCYTES NFR BLD AUTO: 5 % (ref 4–12)
NEUTROPHILS # BLD AUTO: 6.58 THOUSANDS/ÂΜL (ref 1.85–7.62)
NEUTS SEG NFR BLD AUTO: 71 % (ref 43–75)
NRBC BLD AUTO-RTO: 0 /100 WBCS
P AXIS: 25 DEGREES
PLATELET # BLD AUTO: 378 THOUSANDS/UL (ref 149–390)
PMV BLD AUTO: 10.3 FL (ref 8.9–12.7)
POTASSIUM SERPL-SCNC: 4.1 MMOL/L (ref 3.5–5.3)
PR INTERVAL: 146 MS
PROT SERPL-MCNC: 7.1 G/DL (ref 6.4–8.4)
QRS AXIS: 12 DEGREES
QRSD INTERVAL: 74 MS
QT INTERVAL: 392 MS
QTC INTERVAL: 443 MS
RBC # BLD AUTO: 4.35 MILLION/UL (ref 3.81–5.12)
SODIUM SERPL-SCNC: 138 MMOL/L (ref 135–147)
T WAVE AXIS: 25 DEGREES
VENTRICULAR RATE: 77 BPM
WBC # BLD AUTO: 9.3 THOUSAND/UL (ref 4.31–10.16)

## 2023-07-17 PROCEDURE — 36415 COLL VENOUS BLD VENIPUNCTURE: CPT | Performed by: EMERGENCY MEDICINE

## 2023-07-17 PROCEDURE — C9113 INJ PANTOPRAZOLE SODIUM, VIA: HCPCS | Performed by: EMERGENCY MEDICINE

## 2023-07-17 PROCEDURE — 93010 ELECTROCARDIOGRAM REPORT: CPT | Performed by: INTERNAL MEDICINE

## 2023-07-17 PROCEDURE — 80053 COMPREHEN METABOLIC PANEL: CPT | Performed by: EMERGENCY MEDICINE

## 2023-07-17 PROCEDURE — 99285 EMERGENCY DEPT VISIT HI MDM: CPT

## 2023-07-17 PROCEDURE — 71045 X-RAY EXAM CHEST 1 VIEW: CPT

## 2023-07-17 PROCEDURE — 96374 THER/PROPH/DIAG INJ IV PUSH: CPT

## 2023-07-17 PROCEDURE — 84484 ASSAY OF TROPONIN QUANT: CPT | Performed by: EMERGENCY MEDICINE

## 2023-07-17 PROCEDURE — 93005 ELECTROCARDIOGRAM TRACING: CPT

## 2023-07-17 PROCEDURE — 85025 COMPLETE CBC W/AUTO DIFF WBC: CPT | Performed by: EMERGENCY MEDICINE

## 2023-07-17 RX ORDER — MAGNESIUM HYDROXIDE/ALUMINUM HYDROXICE/SIMETHICONE 120; 1200; 1200 MG/30ML; MG/30ML; MG/30ML
30 SUSPENSION ORAL ONCE
Status: COMPLETED | OUTPATIENT
Start: 2023-07-17 | End: 2023-07-17

## 2023-07-17 RX ORDER — PANTOPRAZOLE SODIUM 40 MG/10ML
40 INJECTION, POWDER, LYOPHILIZED, FOR SOLUTION INTRAVENOUS ONCE
Status: COMPLETED | OUTPATIENT
Start: 2023-07-17 | End: 2023-07-17

## 2023-07-17 RX ADMIN — ALUMINUM HYDROXIDE, MAGNESIUM HYDROXIDE, AND DIMETHICONE 30 ML: 200; 20; 200 SUSPENSION ORAL at 14:45

## 2023-07-17 RX ADMIN — PANTOPRAZOLE SODIUM 40 MG: 40 INJECTION, POWDER, FOR SOLUTION INTRAVENOUS at 14:46

## 2023-07-17 NOTE — ED PROVIDER NOTES
History  Chief Complaint   Patient presents with   • Chest Pain     Patient is a 79-year-old female with a history of ascending aortic aneurysm, eosinophilic esophagitis, GERD and gastritis who presents with chest pain. Patient states that she had an episode of chest pain yesterday while carrying laundry up the stairs. She states that it improved at rest and did not recur yesterday. However she had a recurrence of chest pain at lunch this afternoon while at work. She describes it as a burning sensation in her substernal chest which radiated into her left chest.  She denies associated shortness of breath, nausea, vomiting, diaphoresis. She states that her pain has improved in the ED but is still present. She has not had similar episodes of chest pain previously. She states that this is not similar to previous eosinophilic esophagitis. History provided by:  Patient  Chest Pain  Pain location:  Substernal area  Pain quality: burning    Radiates to: L chest.  Pain radiates to the back: no    Pain severity:  Mild  Duration:  2 days  Timing:  Intermittent  Chronicity:  New  Ineffective treatments:  None tried  Associated symptoms: no abdominal pain, no back pain, no cough, no diaphoresis, no dizziness, no dysphagia, no fever, no headache, no lower extremity edema, no nausea, no palpitations, no shortness of breath and not vomiting        Prior to Admission Medications   Prescriptions Last Dose Informant Patient Reported? Taking? Multiple Vitamin (multivitamin) tablet   Yes No   Sig: Take 1 tablet by mouth daily   Na Sulfate-K Sulfate-Mg Sulf (Suprep Bowel Prep Kit) 17.5-3.13-1.6 GM/177ML SOLN   No No   Sig: Please follow prep instructions provided by the office.    acetaminophen (TYLENOL) 325 mg tablet   No No   Sig: Take 2 tablets (650 mg total) by mouth every 4 (four) hours as needed for mild pain   esomeprazole (NexIUM) 40 mg packet   No No   Sig: Take 40 mg by mouth 2 (two) times a day before meals fluticasone (FLOVENT HFA) 220 mcg/act inhaler   No No   Sig: Inhale and swallow two puffs twice daily for 8 weeks, do note eat or drink 30-60 minutes after administration   ibuprofen (MOTRIN) 200 mg tablet   No No   Sig: Take 2 tablets (400 mg total) by mouth every 6 (six) hours as needed for mild pain (Take with food.)      Facility-Administered Medications: None       Past Medical History:   Diagnosis Date   • Aortic aneurysm (HCC)    • Basal cell carcinoma    • Eosinophilic esophagitis    • Gastritis    • GERD (gastroesophageal reflux disease)    • History of breast problem        Past Surgical History:   Procedure Laterality Date   • BASAL CELL CARCINOMA EXCISION  2018    Dr Chely Benson   • BASAL CELL CARCINOMA EXCISION Right     Right ear   • BREAST BIOPSY      X3   • BREAST SURGERY     • BREAST SURGERY Right     Right breast lump removal   • MAMMO (HISTORICAL)  2017    Mammo/U/S 11/2017 stable; next mammo 1 year   • REVISION OF SCAR  09/2019    Excision of Painful scar Right retroauricular area - Dr. Chely Benson   • UMBILICAL HERNIA REPAIR         Family History   Problem Relation Age of Onset   • Lung cancer Father      I have reviewed and agree with the history as documented. E-Cigarette/Vaping   • E-Cigarette Use Never User      E-Cigarette/Vaping Substances     Social History     Tobacco Use   • Smoking status: Never   • Smokeless tobacco: Never   Vaping Use   • Vaping Use: Never used   Substance Use Topics   • Alcohol use: Not Currently   • Drug use: Never       Review of Systems   Constitutional: Negative for chills, diaphoresis and fever. HENT: Negative for nosebleeds, sore throat and trouble swallowing. Eyes: Negative for photophobia, pain and visual disturbance. Respiratory: Negative for cough, chest tightness and shortness of breath. Cardiovascular: Positive for chest pain. Negative for palpitations and leg swelling.    Gastrointestinal: Negative for abdominal pain, constipation, diarrhea, nausea and vomiting. Endocrine: Negative for polydipsia and polyuria. Genitourinary: Negative for difficulty urinating, dysuria, hematuria, pelvic pain, vaginal bleeding and vaginal discharge. Musculoskeletal: Negative for back pain, neck pain and neck stiffness. Skin: Negative for pallor and rash. Neurological: Negative for dizziness, seizures, light-headedness and headaches. All other systems reviewed and are negative. Physical Exam  Physical Exam  Vitals and nursing note reviewed. Constitutional:       General: She is not in acute distress. Appearance: She is well-developed. HENT:      Head: Normocephalic and atraumatic. Eyes:      Pupils: Pupils are equal, round, and reactive to light. Cardiovascular:      Rate and Rhythm: Normal rate and regular rhythm. Pulses: Normal pulses. Heart sounds: Normal heart sounds. Pulmonary:      Effort: Pulmonary effort is normal. No respiratory distress. Breath sounds: Normal breath sounds. Chest:      Chest wall: No tenderness. Abdominal:      General: There is no distension. Palpations: Abdomen is soft. Abdomen is not rigid. Tenderness: There is no abdominal tenderness. There is no guarding or rebound. Musculoskeletal:         General: No tenderness. Normal range of motion. Cervical back: Normal range of motion and neck supple. Lymphadenopathy:      Cervical: No cervical adenopathy. Skin:     General: Skin is warm and dry. Capillary Refill: Capillary refill takes less than 2 seconds. Neurological:      Mental Status: She is alert and oriented to person, place, and time. Cranial Nerves: No cranial nerve deficit. Sensory: No sensory deficit.          Vital Signs  ED Triage Vitals [07/17/23 1309]   Temperature Pulse Respirations Blood Pressure SpO2   97.5 °F (36.4 °C) 65 16 117/66 99 %      Temp Source Heart Rate Source Patient Position - Orthostatic VS BP Location FiO2 (%)   Oral Monitor Sitting Right arm --      Pain Score       --           Vitals:    07/17/23 1309   BP: 117/66   Pulse: 65   Patient Position - Orthostatic VS: Sitting         Visual Acuity      ED Medications  Medications   pantoprazole (PROTONIX) injection 40 mg (40 mg Intravenous Given 7/17/23 1446)   aluminum-magnesium hydroxide-simethicone (MAALOX) oral suspension 30 mL (30 mL Oral Given 7/17/23 1445)       Diagnostic Studies  Results Reviewed     Procedure Component Value Units Date/Time    HS Troponin I 2hr [[id number]]  (Normal) Collected: 07/17/23 1638    Lab Status: Final result Specimen: Blood from Arm, Right Updated: 07/17/23 1707     hs TnI 2hr 2 ng/L      Delta 2hr hsTnI >0 ng/L     HS Troponin 0hr (reflex protocol) [[id number]]  (Normal) Collected: 07/17/23 1353    Lab Status: Final result Specimen: Blood from Arm, Right Updated: 07/17/23 1426     hs TnI 0hr <2 ng/L     Comprehensive metabolic panel [[id number]] Collected: 07/17/23 1353    Lab Status: Final result Specimen: Blood from Arm, Right Updated: 07/17/23 1420     Sodium 138 mmol/L      Potassium 4.1 mmol/L      Chloride 104 mmol/L      CO2 26 mmol/L      ANION GAP 8 mmol/L      BUN 11 mg/dL      Creatinine 0.79 mg/dL      Glucose 104 mg/dL      Calcium 9.0 mg/dL      AST 15 U/L      ALT 11 U/L      Alkaline Phosphatase 66 U/L      Total Protein 7.1 g/dL      Albumin 4.2 g/dL      Total Bilirubin 0.34 mg/dL      eGFR 86 ml/min/1.73sq m     Narrative:      [place] guidelines for Chronic Kidney Disease (CKD):   •  Stage 1 with normal or high GFR (GFR > 90 mL/min/1.73 square meters)  •  Stage 2 Mild CKD (GFR = 60-89 mL/min/1.73 square meters)  •  Stage 3A Moderate CKD (GFR = 45-59 mL/min/1.73 square meters)  •  Stage 3B Moderate CKD (GFR = 30-44 mL/min/1.73 square meters)  •  Stage 4 Severe CKD (GFR = 15-29 mL/min/1.73 square meters)  •  Stage 5 End Stage CKD (GFR <15 mL/min/1.73 square meters)  Note: GFR calculation is accurate only with a steady state creatinine    CBC and differential [[id number]]  (Abnormal) Collected: 07/17/23 1353    Lab Status: Final result Specimen: Blood from Arm, Right Updated: 07/17/23 1400     WBC 9.30 Thousand/uL      RBC 4.35 Million/uL      Hemoglobin 11.0 g/dL      Hematocrit 35.1 %      MCV 81 fL      MCH 25.3 pg      MCHC 31.3 g/dL      RDW 15.5 %      MPV 10.3 fL      Platelets 450 Thousands/uL      nRBC 0 /100 WBCs      Neutrophils Relative 71 %      Immat GRANS % 0 %      Lymphocytes Relative 21 %      Monocytes Relative 5 %      Eosinophils Relative 3 %      Basophils Relative 0 %      Neutrophils Absolute 6.58 Thousands/µL      Immature Grans Absolute 0.02 Thousand/uL      Lymphocytes Absolute 1.92 Thousands/µL      Monocytes Absolute 0.49 Thousand/µL      Eosinophils Absolute 0.26 Thousand/µL      Basophils Absolute 0.03 Thousands/µL                  XR chest 1 view portable   ED Interpretation by Dina Chase DO (07/17 1433)   No widened mediastinum. No infiltrates. No cardiomegaly. Procedures  Procedures         ED Course             HEART Risk Score    Flowsheet Row Most Recent Value   Heart Score Risk Calculator    History 1 Filed at: 07/17/2023 1440   ECG 0 Filed at: 07/17/2023 1440   Age 1 Filed at: 07/17/2023 1440   Risk Factors 0 Filed at: 07/17/2023 1440   Troponin 0 Filed at: 07/17/2023 1440   HEART Score 2 Filed at: 07/17/2023 1440                                      Medical Decision Making  Patient presents with chest pain. Possible GI etiology. She had improvement with Mylanta and PPI. Do not suspect ACS, aortic dissection, pneumothorax, pericardial tamponade, pulmonary embolism, esophageal rupture as cause of chest pain. HEART score completed. Shared decision making used and patient prefers discharge and outpatient follow up. Patient will follow up with PCP to facilitate further workup. Advised to return to ED immediately if symptoms return.      Portions of the above record have been created with voice recognition software.  Occasional wrong word or "sound alike" substitutions may have occurred due to the inherent limitations of voice recognition software.  Read the chart carefully and recognize, using context, where substitutions may have occurred. Chest pain:     Details: Possible GI etiology, such as gastritis, GERD, peptic ulcer disease. Low suspicion for ACS or other significant cardiopulmonary pathology. EKG and serial troponins are unremarkable. Patient is pain-free and appropriate for discharge with outpatient follow-up. Amount and/or Complexity of Data Reviewed  External Data Reviewed: notes. Labs: ordered. Radiology: ordered and independent interpretation performed. ECG/medicine tests: ordered and independent interpretation performed. Risk  OTC drugs. Prescription drug management. Disposition  Final diagnoses:   Chest pain     Time reflects when diagnosis was documented in both MDM as applicable and the Disposition within this note     Time User Action Codes Description Comment    7/17/2023  5:08 PM Luis Manuel Hartmann Add [R07.9] Chest pain       ED Disposition     ED Disposition   Discharge    Condition   Stable    Date/Time   Mon Jul 17, 2023  5:08 PM    Comment   Orlejermain June discharge to home/self care.                Follow-up Information     Follow up With Specialties Details Why Contact Info    Dayton Melton PA-C Physician Assistant Schedule an appointment as soon as possible for a visit  Return to ED sooner if symptoms worsen or persist. [facility] [facility]  [address] [place] [id number] [phone]            Discharge Medication List as of 7/17/2023  5:08 PM      CONTINUE these medications which have NOT CHANGED    Details   acetaminophen (TYLENOL) 325 mg tablet Take 2 tablets (650 mg total) by mouth every 4 (four) hours as needed for mild pain, Starting Sat 6/4/2022, No Print      esomeprazole (NexIUM) 40 mg packet Take 40 mg by mouth 2 (two) times a day before meals, Starting Fri 3/26/2021, Normal      fluticasone (FLOVENT HFA) 220 mcg/act inhaler Inhale and swallow two puffs twice daily for 8 weeks, do note eat or drink 30-60 minutes after administration, Normal      ibuprofen (MOTRIN) 200 mg tablet Take 2 tablets (400 mg total) by mouth every 6 (six) hours as needed for mild pain (Take with food.), Starting Sat 6/4/2022, Until Mon 7/4/2022 at 2359, No Print      Multiple Vitamin (multivitamin) tablet Take 1 tablet by mouth daily, Historical Med      Na Sulfate-K Sulfate-Mg Sulf (Suprep Bowel Prep Kit) 17.5-3.13-1.6 GM/177ML SOLN Please follow prep instructions provided by the office., Normal             No discharge procedures on file.     PDMP Review     None          ED Provider  Electronically Signed by           Neelima Oliver DO  07/17/23 1297

## 2023-07-17 NOTE — ED NOTES
Pt states she was eating a salad with vinegar and oil when she began to experience burning in her chest. She states the burning feels like her episodes of reflux. Is on protonix but hasn't taken in a few days.      Delmer Williamson RN  07/17/23 9686

## 2023-09-01 ENCOUNTER — OFFICE VISIT (OUTPATIENT)
Dept: OBGYN CLINIC | Facility: CLINIC | Age: 53
End: 2023-09-01
Payer: COMMERCIAL

## 2023-09-01 VITALS
HEIGHT: 63 IN | DIASTOLIC BLOOD PRESSURE: 70 MMHG | WEIGHT: 173.2 LBS | SYSTOLIC BLOOD PRESSURE: 118 MMHG | BODY MASS INDEX: 30.69 KG/M2

## 2023-09-01 DIAGNOSIS — Z12.4 ENCOUNTER FOR PAPANICOLAOU SMEAR FOR CERVICAL CANCER SCREENING: ICD-10-CM

## 2023-09-01 DIAGNOSIS — Z01.419 ENCOUNTER FOR GYNECOLOGICAL EXAMINATION (GENERAL) (ROUTINE) WITHOUT ABNORMAL FINDINGS: Primary | ICD-10-CM

## 2023-09-01 PROBLEM — J84.10 LUNG GRANULOMA (HCC): Status: ACTIVE | Noted: 2022-06-24

## 2023-09-01 PROBLEM — I71.20 THORACIC AORTIC ANEURYSM WITHOUT RUPTURE (HCC): Status: ACTIVE | Noted: 2022-07-29

## 2023-09-01 PROBLEM — E66.9 OBESITY (BMI 30-39.9): Status: ACTIVE | Noted: 2021-03-29

## 2023-09-01 PROBLEM — I77.810 ECTATIC THORACIC AORTA (HCC): Status: ACTIVE | Noted: 2022-06-18

## 2023-09-01 PROBLEM — M48.061 LUMBAR SPINAL STENOSIS: Status: ACTIVE | Noted: 2023-03-01

## 2023-09-01 PROCEDURE — S0612 ANNUAL GYNECOLOGICAL EXAMINA: HCPCS | Performed by: PHYSICIAN ASSISTANT

## 2023-09-01 PROCEDURE — G0145 SCR C/V CYTO,THINLAYER,RESCR: HCPCS | Performed by: PHYSICIAN ASSISTANT

## 2023-09-01 PROCEDURE — G0476 HPV COMBO ASSAY CA SCREEN: HCPCS | Performed by: PHYSICIAN ASSISTANT

## 2023-09-01 RX ORDER — FERROUS SULFATE 325(65) MG
1 TABLET ORAL
COMMUNITY
Start: 2023-08-22 | End: 2024-08-21

## 2023-09-01 NOTE — PROGRESS NOTES
ASSESSMENT & PLAN: Sam Arriola is a 48 y.o. [id number] with normal gynecologic exam.    1.  Routine well woman exam done today  2. Pap and HPV:  The patient's last pap and hpv was unknown. It was normal per patient. Pap and cotesting was done today. Current ASCCP Guidelines reviewed. 3.  Mammogram screening UTD, recent breast bx on right side at [facility], pathology benign. Pt reports will continue f/u with PCP and [facility] breast center for her future imaging. 4.  Colonoscopy screen UTD. 5. The following were reviewed in today's visit: breast self exam, menopause, adequate intake of calcium and vitamin D, exercise, healthy diet and age appropriate recommendations regarding screenings and prevention. RTO in 1 yr for annual exam, sooner if problems arise in the interim. CC:  Annual Gynecologic Examination    HPI: Sam Arriola is a 48 y.o. [id number] who presents for annual gynecologic examination.  is present with her today, ok with patient to be present in room during visit. She has the following concerns:  None. Ongoing chest pain issues, having cardiac cath ; hx of basal cell skin Ca - has appt with derm for skin check. Spinal fusion L4-L5 3/2023 - doing ok, has completed PT. GERD - intermittent sxs  Hx of anemia - started PO iron. Hx of recent right breast bx last month - benign; [facility] PCP keeping track of f/u and imaging. Healthy diet Yes; drinks mainly water. Occasional OJ low sugar  Vitamins Yes; MVI, iron  Exercise Yes; walking; exercise limited with spinal surgery    Patient's last menstrual period was 2023. Menses frequency: fairly regular, about once a month  Length of bleedin-6 days  Bleeding quality: moderate to average  Pain with menses: Moderate cramping. Denies intermenstrual bleeding. States spotting this period lasting  A little longer than previous - day 7 still with some light spotting. She denies any menopausal sxs.        Bowel or bladder changes: none      Health Maintenance:      She does not perform regular monthly self breast exams. She denies breast pain, lump, skin change or nipple discharge. She feels safe at home. Last Pap: about 7 yrs per patient  Last mammogram: abnl screen, right breast biopsy in august, pathology benign. ([facility])  Last colonoscopy:  - normal. 10 yrs f/u    Past Medical History:   Diagnosis Date   • Anemia    • Aortic aneurysm (HCC)    • Basal cell carcinoma    • Cancer (HCC)     basal cell skin cancer   • Eosinophilic esophagitis    • Gastritis    • GERD (gastroesophageal reflux disease)    • History of breast problem        Past Surgical History:   Procedure Laterality Date   • BASAL CELL CARCINOMA EXCISION      Dr Opal Rouse   • BASAL CELL CARCINOMA EXCISION Right     Right ear   • BREAST BIOPSY      X3   • BREAST SURGERY     • BREAST SURGERY Right     Right breast lump removal   • COLONOSCOPY     • MAMMO (HISTORICAL)      Mammo/U/S 2017 stable; next mammo 1 year   • REVISION OF SCAR  2019    Excision of Painful scar Right retroauricular area - Dr. Opal Rouse   • UMBILICAL HERNIA REPAIR         Past OB/Gyn History:  OB History        3    Para   3    Term   3       0    AB   0    Living   3       SAB   0    IAB   0    Ectopic   0    Multiple   0    Live Births                   Pt does not have menstrual issues. History of sexually transmitted infection: denies. History of abnormal pap smears: No .    Patient is currently sexually active. Monogamous with partner.         Family History   Problem Relation Age of Onset   • Heart disease Mother    • Stroke Mother    • Lung cancer Father        Family history of Breast/Uterine/Ovarian/Colon Cancer: denies    Social History:  Social History     Socioeconomic History   • Marital status: /Civil Union     Spouse name: Not on file   • Number of children: Not on file   • Years of education: Not on file   • Highest education level: Not on file   Occupational History   • Occupation: office products   Tobacco Use   • Smoking status: Never   • Smokeless tobacco: Never   Vaping Use   • Vaping Use: Never used   Substance and Sexual Activity   • Alcohol use: Not Currently   • Drug use: Never   • Sexual activity: Yes     Partners: Male     Birth control/protection: None   Other Topics Concern   • Not on file   Social History Narrative    ** Merged History Encounter **         Most recent tobacco use screenin2019  Do you currently or have you served in the [address] [place] [address] Street: No  Occupation: office products  Marital status:   Sexual orientation: Heterosexual  Diet: Regular  General stress level: Medium  Alcohol in    take: Occasional  Caffeine intake:  Moderate  Chewing tobacco: none  Illicit drugs: none  Guns present in home: No  Seat belts used routinely: Yes  Sunscreen used routinely: Yes  Smoke alarm in home: Yes  Advance directive: No 10/2/19 JS  Live alone or wi    th others: with others  Are there stairs in your home: Yes  Pets: Yes     Social Determinants of Health     Financial Resource Strain: Not on file   Food Insecurity: Not on file   Transportation Needs: Not on file   Physical Activity: Not on file   Stress: Not on file   Social Connections: Not on file   Intimate Partner Violence: Not on file   Housing Stability: Not on file       No Known Allergies    Current Outpatient Medications:   •  acetaminophen (TYLENOL) 325 mg tablet, Take 2 tablets (650 mg total) by mouth every 4 (four) hours as needed for mild pain, Disp: , Rfl: 0  •  ferrous sulfate 325 (65 Fe) mg tablet, Take 1 tablet by mouth daily with breakfast, Disp: , Rfl:   •  Multiple Vitamin (multivitamin) tablet, Take 1 tablet by mouth daily, Disp: , Rfl:   •  esomeprazole (NexIUM) 40 mg packet, Take 40 mg by mouth 2 (two) times a day before meals (Patient not taking: Reported on 2023), Disp: 60 each, Rfl: 3  •  fluticasone (FLOVENT HFA) 220 mcg/act inhaler, Inhale and swallow two puffs twice daily for 8 weeks, do note eat or drink 30-60 minutes after administration (Patient not taking: Reported on 9/1/2023), Disp: 12 g, Rfl: 1  •  ibuprofen (MOTRIN) 200 mg tablet, Take 2 tablets (400 mg total) by mouth every 6 (six) hours as needed for mild pain (Take with food.), Disp: 30 tablet, Rfl: 0  •  Na Sulfate-K Sulfate-Mg Sulf (Suprep Bowel Prep Kit) 17.5-3.13-1.6 GM/177ML SOLN, Please follow prep instructions provided by the office. (Patient not taking: Reported on 9/1/2023), Disp: 366 mL, Rfl: 0      Review of Systems  Constitutional :no fever, feels well, no tiredness, no recent weight gain or loss  ENT: no ear ache, no loss of hearing, no nosebleeds or nasal discharge, no sore throat or hoarseness. Cardiovascular: pre-existant chest pain; no complaints of slow or fast heart beat,  no palpitations, no leg claudication or lower extremity edema. Respiratory: no complaints of shortness of shortness of breath, no FLORES  Breasts:no complaints of breast pain, breast lump, or nipple discharge  Gastrointestinal: GERD; no complaints of abdominal pain, constipation, nausea, vomiting, or diarrhea or bloody stools  Genitourinary : no complaints of dysuria, incontinence, pelvic pain, no dysmenorrhea, vaginal discharge/itch/odor/dryness or abnormal vaginal bleeding. Musculoskeletal: no complaints of arthralgia, no myalgia, no joint swelling or stiffness, no limb pain or swelling. Integumentary: no complaints of skin rash or lesion, itching or dry skin  Neurological: no complaints of headache, no confusion, no numbness or tingling, no dizziness or recent fainting  MH: denies anxiety/depression, SI    Objective      /70 (BP Location: Left arm, Patient Position: Sitting, Cuff Size: Large)   Ht 5' 3" (1.6 m)   Wt 78.6 kg (173 lb 3.2 oz)   LMP 08/26/2023   Breastfeeding No   BMI 30.68 kg/m²     General:   appears stated age, cooperative, alert normal mood and affect.  BMI 30.68   Neck: normal, supple,trachea midline, no masses. Thyroid palpated normal   Heart: regular rate and rhythm, S1, S2 normal, no murmur, click, rub or gallop   Lungs: clear to auscultation bilaterally   Breasts: normal appearance, no masses or tenderness, No nipple retraction or dimpling, No nipple discharge or bleeding, No axillary or supraclavicular adenopathy, Normal to palpation without dominant masses   Abdomen: soft, non-tender, without masses or organomegaly   Vulva: normal female genitalia, no lesions   Vagina: normal vagina, no discharge, exudate, lesion, or erythema   Urethra: normal   Cervix: Normal, no discharge. PAP done. Nontender. Uterus: normal   Adnexa: no mass, fullness, tenderness   Lymphatic palpation of lymph nodes in neck, axilla, groin and/or other locations: no lymphadenopathy or masses noted   Skin normal skin turgor and no rashes.    Psychiatric orientation to person, place, and time: normal. mood and affect: normal

## 2023-09-05 LAB
HPV HR 12 DNA CVX QL NAA+PROBE: NEGATIVE
HPV16 DNA CVX QL NAA+PROBE: NEGATIVE
HPV18 DNA CVX QL NAA+PROBE: NEGATIVE

## 2023-09-11 DIAGNOSIS — N76.0 BV (BACTERIAL VAGINOSIS): Primary | ICD-10-CM

## 2023-09-11 DIAGNOSIS — B96.89 BV (BACTERIAL VAGINOSIS): Primary | ICD-10-CM

## 2023-09-11 LAB
LAB AP GYN PRIMARY INTERPRETATION: NORMAL
Lab: NORMAL
PATH INTERP SPEC-IMP: NORMAL

## 2023-09-11 RX ORDER — METRONIDAZOLE 7.5 MG/G
1 GEL VAGINAL
Qty: 70 G | Refills: 0 | Status: SHIPPED | OUTPATIENT
Start: 2023-09-11 | End: 2023-09-16

## 2023-11-16 ENCOUNTER — HOSPITAL ENCOUNTER (INPATIENT)
Facility: HOSPITAL | Age: 53
LOS: 1 days | Discharge: HOME | DRG: 312 | End: 2023-11-17
Attending: EMERGENCY MEDICINE | Admitting: INTERNAL MEDICINE
Payer: COMMERCIAL

## 2023-11-16 ENCOUNTER — APPOINTMENT (EMERGENCY)
Dept: CT IMAGING | Facility: HOSPITAL | Age: 53
DRG: 312 | End: 2023-11-16
Payer: COMMERCIAL

## 2023-11-16 ENCOUNTER — APPOINTMENT (INPATIENT)
Dept: NON INVASIVE DIAGNOSTICS | Facility: HOSPITAL | Age: 53
DRG: 312 | End: 2023-11-16
Payer: COMMERCIAL

## 2023-11-16 DIAGNOSIS — R55 SYNCOPE: Primary | ICD-10-CM

## 2023-11-16 DIAGNOSIS — I48.91 NEW ONSET ATRIAL FIBRILLATION (HCC): ICD-10-CM

## 2023-11-16 DIAGNOSIS — I48.91 ATRIAL FIBRILLATION WITH RAPID VENTRICULAR RESPONSE (HCC): ICD-10-CM

## 2023-11-16 LAB
2HR DELTA HS TROPONIN: -1 NG/L
4HR DELTA HS TROPONIN: -1 NG/L
ALBUMIN SERPL BCP-MCNC: 4 G/DL (ref 3.5–5)
ALP SERPL-CCNC: 52 U/L (ref 34–104)
ALT SERPL W P-5'-P-CCNC: 12 U/L (ref 7–52)
ANION GAP SERPL CALCULATED.3IONS-SCNC: 9 MMOL/L
AORTIC ROOT: 2.8 CM
APICAL FOUR CHAMBER EJECTION FRACTION: 62 %
ASCENDING AORTA: 3.6 CM
AST SERPL W P-5'-P-CCNC: 15 U/L (ref 13–39)
BASOPHILS # BLD AUTO: 0.03 THOUSANDS/ÂΜL (ref 0–0.1)
BASOPHILS NFR BLD AUTO: 0 % (ref 0–1)
BILIRUB SERPL-MCNC: 0.48 MG/DL (ref 0.2–1)
BUN SERPL-MCNC: 16 MG/DL (ref 5–25)
CALCIUM SERPL-MCNC: 8.7 MG/DL (ref 8.4–10.2)
CARDIAC TROPONIN I PNL SERPL HS: 2 NG/L
CARDIAC TROPONIN I PNL SERPL HS: 2 NG/L
CARDIAC TROPONIN I PNL SERPL HS: 3 NG/L
CHLORIDE SERPL-SCNC: 105 MMOL/L (ref 96–108)
CO2 SERPL-SCNC: 24 MMOL/L (ref 21–32)
CREAT SERPL-MCNC: 0.89 MG/DL (ref 0.6–1.3)
EOSINOPHIL # BLD AUTO: 0.45 THOUSAND/ÂΜL (ref 0–0.61)
EOSINOPHIL NFR BLD AUTO: 6 % (ref 0–6)
ERYTHROCYTE [DISTWIDTH] IN BLOOD BY AUTOMATED COUNT: 14.4 % (ref 11.6–15.1)
FRACTIONAL SHORTENING: 31 (ref 28–44)
GFR SERPL CREATININE-BSD FRML MDRD: 75 ML/MIN/1.73SQ M
GLUCOSE SERPL-MCNC: 102 MG/DL (ref 65–140)
GLUCOSE SERPL-MCNC: 112 MG/DL (ref 65–140)
HCG SERPL QL: NEGATIVE
HCT VFR BLD AUTO: 43.6 % (ref 34.8–46.1)
HGB BLD-MCNC: 13.9 G/DL (ref 11.5–15.4)
IMM GRANULOCYTES # BLD AUTO: 0.02 THOUSAND/UL (ref 0–0.2)
IMM GRANULOCYTES NFR BLD AUTO: 0 % (ref 0–2)
INTERVENTRICULAR SEPTUM IN DIASTOLE (PARASTERNAL SHORT AXIS VIEW): 1 CM
INTERVENTRICULAR SEPTUM: 1 CM (ref 0.6–1.1)
LAAS-AP2: 16.2 CM2
LAAS-AP4: 12.3 CM2
LEFT ATRIUM SIZE: 3.5 CM
LEFT ATRIUM VOLUME (MOD BIPLANE): 36 ML
LEFT ATRIUM VOLUME INDEX (MOD BIPLANE): 20 ML/M2
LEFT INTERNAL DIMENSION IN SYSTOLE: 2.7 CM (ref 2.1–4)
LEFT VENTRICULAR INTERNAL DIMENSION IN DIASTOLE: 3.9 CM (ref 3.5–6)
LEFT VENTRICULAR POSTERIOR WALL IN END DIASTOLE: 1.1 CM
LEFT VENTRICULAR STROKE VOLUME: 41 ML
LVSV (TEICH): 41 ML
LYMPHOCYTES # BLD AUTO: 2.6 THOUSANDS/ÂΜL (ref 0.6–4.47)
LYMPHOCYTES NFR BLD AUTO: 35 % (ref 14–44)
MCH RBC QN AUTO: 27 PG (ref 26.8–34.3)
MCHC RBC AUTO-ENTMCNC: 31.9 G/DL (ref 31.4–37.4)
MCV RBC AUTO: 85 FL (ref 82–98)
MONOCYTES # BLD AUTO: 0.52 THOUSAND/ÂΜL (ref 0.17–1.22)
MONOCYTES NFR BLD AUTO: 7 % (ref 4–12)
NEUTROPHILS # BLD AUTO: 3.92 THOUSANDS/ÂΜL (ref 1.85–7.62)
NEUTS SEG NFR BLD AUTO: 52 % (ref 43–75)
NRBC BLD AUTO-RTO: 0 /100 WBCS
PLATELET # BLD AUTO: 333 THOUSANDS/UL (ref 149–390)
PMV BLD AUTO: 10.7 FL (ref 8.9–12.7)
POTASSIUM SERPL-SCNC: 3.7 MMOL/L (ref 3.5–5.3)
PROT SERPL-MCNC: 7 G/DL (ref 6.4–8.4)
RA PRESSURE ESTIMATED: 5 MMHG
RBC # BLD AUTO: 5.15 MILLION/UL (ref 3.81–5.12)
RIGHT ATRIAL 2D VOLUME: 27 ML
RIGHT ATRIUM AREA SYSTOLE A4C: 11.8 CM2
RIGHT VENTRICLE ID DIMENSION: 2.5 CM
RV PSP: 20 MMHG
SL CV LEFT ATRIUM LENGTH A2C: 5.2 CM
SL CV LV EF: 62
SL CV PED ECHO LEFT VENTRICLE DIASTOLIC VOLUME (MOD BIPLANE) 2D: 67 ML
SL CV PED ECHO LEFT VENTRICLE SYSTOLIC VOLUME (MOD BIPLANE) 2D: 27 ML
SODIUM SERPL-SCNC: 138 MMOL/L (ref 135–147)
TR MAX PG: 15 MMHG
TR PEAK VELOCITY: 1.9 M/S
TRICUSPID ANNULAR PLANE SYSTOLIC EXCURSION: 1.9 CM
TRICUSPID VALVE PEAK REGURGITATION VELOCITY: 1.94 M/S
TSH SERPL DL<=0.05 MIU/L-ACNC: 3.52 UIU/ML (ref 0.45–4.5)
WBC # BLD AUTO: 7.54 THOUSAND/UL (ref 4.31–10.16)

## 2023-11-16 PROCEDURE — 99223 1ST HOSP IP/OBS HIGH 75: CPT | Performed by: INTERNAL MEDICINE

## 2023-11-16 PROCEDURE — 82948 REAGENT STRIP/BLOOD GLUCOSE: CPT

## 2023-11-16 PROCEDURE — 96374 THER/PROPH/DIAG INJ IV PUSH: CPT

## 2023-11-16 PROCEDURE — 93306 TTE W/DOPPLER COMPLETE: CPT

## 2023-11-16 PROCEDURE — 85025 COMPLETE CBC W/AUTO DIFF WBC: CPT | Performed by: EMERGENCY MEDICINE

## 2023-11-16 PROCEDURE — 80053 COMPREHEN METABOLIC PANEL: CPT | Performed by: EMERGENCY MEDICINE

## 2023-11-16 PROCEDURE — 84443 ASSAY THYROID STIM HORMONE: CPT | Performed by: EMERGENCY MEDICINE

## 2023-11-16 PROCEDURE — 84484 ASSAY OF TROPONIN QUANT: CPT | Performed by: EMERGENCY MEDICINE

## 2023-11-16 PROCEDURE — G1004 CDSM NDSC: HCPCS

## 2023-11-16 PROCEDURE — 36415 COLL VENOUS BLD VENIPUNCTURE: CPT | Performed by: EMERGENCY MEDICINE

## 2023-11-16 PROCEDURE — 74174 CTA ABD&PLVS W/CONTRAST: CPT

## 2023-11-16 PROCEDURE — 84703 CHORIONIC GONADOTROPIN ASSAY: CPT | Performed by: EMERGENCY MEDICINE

## 2023-11-16 PROCEDURE — 93306 TTE W/DOPPLER COMPLETE: CPT | Performed by: INTERNAL MEDICINE

## 2023-11-16 PROCEDURE — 93005 ELECTROCARDIOGRAM TRACING: CPT

## 2023-11-16 PROCEDURE — 71275 CT ANGIOGRAPHY CHEST: CPT

## 2023-11-16 PROCEDURE — 99285 EMERGENCY DEPT VISIT HI MDM: CPT

## 2023-11-16 RX ORDER — LANOLIN ALCOHOL/MO/W.PET/CERES
400 CREAM (GRAM) TOPICAL 2 TIMES DAILY
Status: DISCONTINUED | OUTPATIENT
Start: 2023-11-16 | End: 2023-11-17

## 2023-11-16 RX ORDER — MAGNESIUM SULFATE HEPTAHYDRATE 40 MG/ML
4 INJECTION, SOLUTION INTRAVENOUS ONCE
Status: COMPLETED | OUTPATIENT
Start: 2023-11-16 | End: 2023-11-16

## 2023-11-16 RX ORDER — DILTIAZEM HYDROCHLORIDE 5 MG/ML
0.25 INJECTION INTRAVENOUS ONCE
Status: COMPLETED | OUTPATIENT
Start: 2023-11-16 | End: 2023-11-16

## 2023-11-16 RX ORDER — MAGNESIUM SULFATE HEPTAHYDRATE 40 MG/ML
2 INJECTION, SOLUTION INTRAVENOUS ONCE
Status: COMPLETED | OUTPATIENT
Start: 2023-11-16 | End: 2023-11-16

## 2023-11-16 RX ADMIN — IOHEXOL 100 ML: 350 INJECTION, SOLUTION INTRAVENOUS at 06:01

## 2023-11-16 RX ADMIN — Medication 400 MG: at 11:03

## 2023-11-16 RX ADMIN — APIXABAN 5 MG: 5 TABLET, FILM COATED ORAL at 18:21

## 2023-11-16 RX ADMIN — APIXABAN 5 MG: 5 TABLET, FILM COATED ORAL at 11:03

## 2023-11-16 RX ADMIN — DILTIAZEM HYDROCHLORIDE 60 MG: 30 TABLET, FILM COATED ORAL at 07:41

## 2023-11-16 RX ADMIN — Medication 400 MG: at 18:21

## 2023-11-16 RX ADMIN — MAGNESIUM SULFATE HEPTAHYDRATE 2 G: 40 INJECTION, SOLUTION INTRAVENOUS at 08:07

## 2023-11-16 RX ADMIN — METOPROLOL TARTRATE 25 MG: 25 TABLET, FILM COATED ORAL at 13:17

## 2023-11-16 RX ADMIN — MAGNESIUM SULFATE HEPTAHYDRATE 4 G: 40 INJECTION, SOLUTION INTRAVENOUS at 13:16

## 2023-11-16 RX ADMIN — DILTIAZEM HYDROCHLORIDE 20 MG: 5 INJECTION INTRAVENOUS at 06:47

## 2023-11-16 NOTE — ASSESSMENT & PLAN NOTE
Patient presented with syncopal episode, found to be in A-fib RVR responded well to diltiazem  Hold further diltiazem until echocardiogram is completed to assess ejection fraction, based on her symptoms and prior syncopal episode earlier this year likely has had A-fib for some time, however on exam she does not look overloaded and I do have lower concern for heart failure at least severe category.    Started on Lopressor 25 mg twice daily  Continue Eliquis 5 mg twice daily, sent for price check  Continue to monitor on telemetry

## 2023-11-16 NOTE — PLAN OF CARE
Problem: Potential for Falls  Goal: Patient will remain free of falls  Description: INTERVENTIONS:  - Educate patient/family on patient safety including physical limitations  - Instruct patient to call for assistance with activity   - Consult OT/PT to assist with strengthening/mobility   - Keep Call bell within reach  - Keep bed low and locked with side rails adjusted as appropriate  - Keep care items and personal belongings within reach  - Initiate and maintain comfort rounds  - Make Fall Risk Sign visible to staff  - Apply yellow socks and bracelet for high fall risk patients  - Consider moving patient to room near nurses station  Outcome: Progressing     Problem: PAIN - ADULT  Goal: Verbalizes/displays adequate comfort level or baseline comfort level  Description: Interventions:  - Encourage patient to monitor pain and request assistance  - Assess pain using appropriate pain scale  - Administer analgesics based on type and severity of pain and evaluate response  - Implement non-pharmacological measures as appropriate and evaluate response  - Consider cultural and social influences on pain and pain management  - Notify physician/advanced practitioner if interventions unsuccessful or patient reports new pain  Outcome: Progressing     Problem: DISCHARGE PLANNING  Goal: Discharge to home or other facility with appropriate resources  Description: INTERVENTIONS:  - Identify barriers to discharge w/patient and caregiver  - Arrange for needed discharge resources and transportation as appropriate  - Identify discharge learning needs (meds, wound care, etc.)  - Arrange for interpretive services to assist at discharge as needed  - Refer to Case Management Department for coordinating discharge planning if the patient needs post-hospital services based on physician/advanced practitioner order or complex needs related to functional status, cognitive ability, or social support system  Outcome: Progressing

## 2023-11-16 NOTE — ASSESSMENT & PLAN NOTE
Patient reports syncopal episode this morning without any significant prodrome, she was not confused after the event did not have any urinary or bowel incontinence.   2 prior episodes occurring this year while at work, during 1 of those episodes she felt some palpitations prior and some lightheadedness  Monitor on telemetry, check echocardiogram, suspect cardiac syncope in the setting of A-fib RVR

## 2023-11-16 NOTE — CASE MANAGEMENT
Case Management Progress Note    Patient name Griselda Dixon  Location [address] [facility] [place] 351/-[id number] MRN [id number]  : 1970 Date 2023       LOS (days): 0  Geometric Mean LOS (GMLOS) (days):   Days to GMLOS:        OBJECTIVE:        Current admission status: Inpatient  Preferred Pharmacy:   [address], [address] [address]  Phone: [phone] Fax: [phone]    Primary Care Provider: Nohemy Orellana PA-C    Primary Insurance: [facility]  Secondary Insurance:     PROGRESS NOTE:    Cm was informed that patient will need to dc home on Eliquis. Cm completed price check. Cm informed by [facility] pharmacy that cost of medication will be $50 a month. Cm provided information to MD. Cm will follow for other dc needs. 11

## 2023-11-16 NOTE — H&P
[address]  H&P  Name: [facility] 48 y.o. female I MRN: [id number]  Unit/Bed#: -01 I Date of Admission: 11/16/2023   Date of Service: 11/16/2023 I Hospital Day: 0      Assessment/Plan   Obesity (BMI 30-39. 9)  Assessment & Plan  Nutritional lifestyle changes discussed    Lumbar spinal stenosis  Assessment & Plan  PT OT consult    Aortic aneurysm (HCC)  Assessment & Plan  Stable, outpatient follow-up    Syncope  Assessment & Plan  Patient reports syncopal episode this morning without any significant prodrome, she was not confused after the event did not have any urinary or bowel incontinence. 2 prior episodes occurring this year while at work, during 1 of those episodes she felt some palpitations prior and some lightheadedness  Monitor on telemetry, check echocardiogram, suspect cardiac syncope in the setting of A-fib RVR    * Atrial fibrillation with rapid ventricular response [facility])  Assessment & Plan  Patient presented with syncopal episode, found to be in A-fib RVR responded well to diltiazem  Hold further diltiazem until echocardiogram is completed to assess ejection fraction, based on her symptoms and prior syncopal episode earlier this year likely has had A-fib for some time, however on exam she does not look overloaded and I do have lower concern for heart failure at least severe category. Started on Lopressor 25 mg twice daily  Continue Eliquis 5 mg twice daily, sent for price check  Continue to monitor on telemetry               VTE Pharmacologic Prophylaxis: VTE Score: 0 High Risk (Score >/= 5) - Pharmacological DVT Prophylaxis Ordered: apixaban (Eliquis). Sequential Compression Devices Ordered. Code Status: Level 1 - Full Code   Discussion with family: Updated  (daughter) at bedside. Anticipated Length of Stay: Patient will be admitted on an inpatient basis with an anticipated length of stay of greater than 2 midnights secondary to afib rvr.     Total Time Spent on Date of Encounter in care of patient: 35 mins. This time was spent on one or more of the following: performing physical exam; counseling and coordination of care; obtaining or reviewing history; documenting in the medical record; reviewing/ordering tests, medications or procedures; communicating with other healthcare professionals and discussing with patient's family/caregivers. Chief Complaint: syncope    History of Present Illness:  Nayeli Reed is a 48 y.o. female with a PMH of gerd   who presents for evaluation of syncopal episode. Patient reports feeling normal this morning when she woke up going to the bathroom to turn on the shower and when ambulating in the bathroom she suddenly lost consciousness. Denied any prodromal symptoms prior to her losing consciousness, after waking up she did not have any postictal state, she was not confused, did not have any urinary or bowel incontinence. Did mention her right upper extremity twitching several times. Reports 2 similar episodes of syncopal episodes earlier this year at work, with one of them having palpitations as a prodrome. When patient was assessed by EMS she was alert and oriented x4. She was noted to be in A-fib RVR and responded well to diltiazem. Will be admitted for new onset A-fib RVR and monitoring. Review of Systems:  Review of Systems   Constitutional:  Negative for chills, fatigue and fever. HENT:  Negative for ear pain and sore throat. Eyes:  Negative for pain and visual disturbance. Respiratory:  Negative for cough, shortness of breath and wheezing. Cardiovascular:  Negative for chest pain, palpitations and leg swelling. Gastrointestinal:  Negative for abdominal distention, abdominal pain, diarrhea, nausea and vomiting. Endocrine: Negative for polydipsia, polyphagia and polyuria. Genitourinary:  Negative for dysuria and hematuria. Musculoskeletal:  Negative for arthralgias and back pain.    Skin: Negative for color change and rash. Neurological:  Positive for light-headedness. Negative for dizziness, seizures, syncope, weakness and headaches. All other systems reviewed and are negative. Past Medical and Surgical History:   Past Medical History:   Diagnosis Date    Anemia     Aortic aneurysm (HCC)     Basal cell carcinoma     Cancer (HCC)     basal cell skin cancer    Eosinophilic esophagitis     Gastritis     GERD (gastroesophageal reflux disease)     History of breast problem        Past Surgical History:   Procedure Laterality Date    BASAL CELL CARCINOMA EXCISION  2018    Dr Fei Hall Right     Right ear    BREAST BIOPSY      X3    BREAST SURGERY      BREAST SURGERY Right     Right breast lump removal    COLONOSCOPY      MAMMO (HISTORICAL)  2017    Mammo/U/S 11/2017 stable; next mammo 1 year    REVISION OF SCAR  09/2019    Excision of Painful scar Right retroauricular area - Dr. Eren Bazan         Meds/Allergies:  Prior to Admission medications    Medication Sig Start Date End Date Taking?  Authorizing Provider   apixaban (Eliquis) 5 mg Take 1 tablet (5 mg total) by mouth 2 (two) times a day 11/16/23  Yes Dirk Martinez DO   acetaminophen (TYLENOL) 325 mg tablet Take 2 tablets (650 mg total) by mouth every 4 (four) hours as needed for mild pain 6/4/22   Yue Joshi PA-C   esomeprazole (NexIUM) 40 mg packet Take 40 mg by mouth 2 (two) times a day before meals  Patient not taking: Reported on 9/1/2023 3/26/21   Benjamin Qureshi DO   ferrous sulfate 325 (65 Fe) mg tablet Take 1 tablet by mouth daily with breakfast 8/22/23 8/21/24  Historical Provider, MD   fluticasone (FLOVENT HFA) 220 mcg/act inhaler Inhale and swallow two puffs twice daily for 8 weeks, do note eat or drink 30-60 minutes after administration  Patient not taking: Reported on 9/1/2023 6/18/21   Aiden Daniels PA-C   ibuprofen (MOTRIN) 200 mg tablet Take 2 tablets (400 mg total) by mouth every 6 (six) hours as needed for mild pain (Take with food.) 6/4/22 7/4/22  Kandis River PA-C   Multiple Vitamin (multivitamin) tablet Take 1 tablet by mouth daily    Historical Provider, MD   Na Sulfate-K Sulfate-Mg Sulf (Suprep Bowel Prep Kit) 17.5-3.13-1.6 GM/177ML SOLN Please follow prep instructions provided by the office. Patient not taking: Reported on 9/1/2023 3/30/21   Paul Blum PA-C     I have reviewed home medications with patient personally. Allergies: No Known Allergies    Social History:  Marital Status: /Civil Union   Occupation:   Patient Pre-hospital Living Situation: Home  Patient Pre-hospital Level of Mobility: walks  Patient Pre-hospital Diet Restrictions:   Substance Use History:   Social History     Substance and Sexual Activity   Alcohol Use Not Currently     Social History     Tobacco Use   Smoking Status Never   Smokeless Tobacco Never     Social History     Substance and Sexual Activity   Drug Use Never       Family History:  Family History   Problem Relation Age of Onset    Heart disease Mother     Stroke Mother     Lung cancer Father        Physical Exam:     Vitals:   Blood Pressure: 132/74 (11/16/23 1204)  Pulse: 59 (11/16/23 1204)  Temperature: 98 °F (36.7 °C) (11/16/23 0914)  Temp Source: Oral (11/16/23 0914)  Respirations: 16 (11/16/23 0914)  Height: 5' 3" (160 cm) (11/16/23 1204)  Weight - Scale: 76.7 kg (169 lb) (11/16/23 1204)  SpO2: 95 % (11/16/23 0914)    Physical Exam  Vitals and nursing note reviewed. Constitutional:       General: She is not in acute distress. Appearance: She is well-developed. She is not toxic-appearing or diaphoretic. HENT:      Head: Normocephalic and atraumatic. Eyes:      General: No scleral icterus. Conjunctiva/sclera: Conjunctivae normal.   Cardiovascular:      Rate and Rhythm: Tachycardia present. Rhythm irregular. Heart sounds: No murmur heard. No friction rub. No gallop.    Pulmonary: Effort: Pulmonary effort is normal. No respiratory distress. Breath sounds: Normal breath sounds. No stridor. No wheezing, rhonchi or rales. Chest:      Chest wall: No tenderness. Abdominal:      General: There is no distension. Palpations: Abdomen is soft. There is no mass. Tenderness: There is no abdominal tenderness. There is no guarding or rebound. Hernia: No hernia is present. Musculoskeletal:         General: No swelling or tenderness. Cervical back: Neck supple. Right lower leg: No edema. Left lower leg: No edema. Skin:     General: Skin is warm and dry. Capillary Refill: Capillary refill takes less than 2 seconds. Neurological:      Mental Status: She is alert. Psychiatric:         Mood and Affect: Mood normal.          Additional Data:     Lab Results:  Results from last 7 days   Lab Units 11/16/23  0424   WBC Thousand/uL 7.54   HEMOGLOBIN g/dL 13.9   HEMATOCRIT % 43.6   PLATELETS Thousands/uL 333   NEUTROS PCT % 52   LYMPHS PCT % 35   MONOS PCT % 7   EOS PCT % 6     Results from last 7 days   Lab Units 11/16/23  0424   SODIUM mmol/L 138   POTASSIUM mmol/L 3.7   CHLORIDE mmol/L 105   CO2 mmol/L 24   BUN mg/dL 16   CREATININE mg/dL 0.89   ANION GAP mmol/L 9   CALCIUM mg/dL 8.7   ALBUMIN g/dL 4.0   TOTAL BILIRUBIN mg/dL 0.48   ALK PHOS U/L 52   ALT U/L 12   AST U/L 15   GLUCOSE RANDOM mg/dL 112         Results from last 7 days   Lab Units 11/16/23  0423   POC GLUCOSE mg/dl 102               Lines/Drains:  Invasive Devices       Peripheral Intravenous Line  Duration             Peripheral IV 11/16/23 Proximal;Right;Ventral (anterior) Forearm <1 day    Peripheral IV 11/16/23 Right;Ventral (anterior); Upper Forearm <1 day                        Imaging: Reviewed radiology reports from this admission including: abdominal/pelvic CT  CTA dissection protocol chest abdomen pelvis w wo contrast   Final Result by Kitty Gonzales MD ([id number])      Normal CT angiography examination of the chest, and pelvis. No acute abnormality identified. Workstation performed: [id number]             EKG and Other Studies Reviewed on Admission:   EKG:  afib. ** Please Note: This note has been constructed using a voice recognition system.  **

## 2023-11-16 NOTE — ED PROVIDER NOTES
History  Chief Complaint   Patient presents with    Medical Problem     Pt arrived via ems per ems pt was found by significant other " unresponsive". Pt A/OX4 upon arrival. Pt states passing out this morning in bathroom ,denies weakness, numbness,tingling   hx of passing out at work. +chest pain. Denies sob,nvd. 324 of Asprin given ems. HPI    Prior to Admission Medications   Prescriptions Last Dose Informant Patient Reported? Taking? Multiple Vitamin (multivitamin) tablet   Yes No   Sig: Take 1 tablet by mouth daily   Na Sulfate-K Sulfate-Mg Sulf (Suprep Bowel Prep Kit) 17.5-3.13-1.6 GM/177ML SOLN   No No   Sig: Please follow prep instructions provided by the office.    Patient not taking: Reported on 9/1/2023   acetaminophen (TYLENOL) 325 mg tablet   No No   Sig: Take 2 tablets (650 mg total) by mouth every 4 (four) hours as needed for mild pain   esomeprazole (NexIUM) 40 mg packet   No No   Sig: Take 40 mg by mouth 2 (two) times a day before meals   Patient not taking: Reported on 9/1/2023   ferrous sulfate 325 (65 Fe) mg tablet   Yes No   Sig: Take 1 tablet by mouth daily with breakfast   fluticasone (FLOVENT HFA) 220 mcg/act inhaler   No No   Sig: Inhale and swallow two puffs twice daily for 8 weeks, do note eat or drink 30-60 minutes after administration   Patient not taking: Reported on 9/1/2023   ibuprofen (MOTRIN) 200 mg tablet   No No   Sig: Take 2 tablets (400 mg total) by mouth every 6 (six) hours as needed for mild pain (Take with food.)      Facility-Administered Medications: None       Past Medical History:   Diagnosis Date    Anemia     Aortic aneurysm (HCC)     Basal cell carcinoma     Cancer (HCC)     basal cell skin cancer    Eosinophilic esophagitis     Gastritis     GERD (gastroesophageal reflux disease)     History of breast problem        Past Surgical History:   Procedure Laterality Date    BASAL CELL CARCINOMA EXCISION  2018    Dr Fei Hall Right     Right ear    BREAST BIOPSY      X3    BREAST SURGERY      BREAST SURGERY Right     Right breast lump removal    COLONOSCOPY      MAMMO (HISTORICAL)  2017    Mammo/U/S 11/2017 stable; next mammo 1 year    REVISION OF SCAR  09/2019    Excision of Painful scar Right retroauricular area - Dr. Yahaira Orourke         Family History   Problem Relation Age of Onset    Heart disease Mother     Stroke Mother     Lung cancer Father      I have reviewed and agree with the history as documented.     E-Cigarette/Vaping    E-Cigarette Use Never User      E-Cigarette/Vaping Substances    THC No     CBD No      Social History     Tobacco Use    Smoking status: Never    Smokeless tobacco: Never   Vaping Use    Vaping Use: Never used   Substance Use Topics    Alcohol use: Not Currently    Drug use: Never       Review of Systems    Physical Exam  Physical Exam    Vital Signs  ED Triage Vitals   Temperature Pulse Respirations Blood Pressure SpO2   11/16/23 0417 11/16/23 0417 11/16/23 0417 11/16/23 0428 11/16/23 0417   97.9 °F (36.6 °C) 100 18 113/58 100 %      Temp Source Heart Rate Source Patient Position - Orthostatic VS BP Location FiO2 (%)   11/16/23 0417 11/16/23 0417 -- 11/16/23 0428 --   Oral Monitor  Left arm       Pain Score       --                  Vitals:    11/16/23 0636 11/16/23 0639 11/16/23 0700 11/16/23 0741   BP:   107/66 108/80   Pulse: (!) 163 (!) 107 65          Visual Acuity  Visual Acuity      Flowsheet Row Most Recent Value   L Pupil Size (mm) 3   R Pupil Size (mm) 3            ED Medications  Medications   magnesium sulfate 2 g/50 mL IVPB (premix) 2 g (has no administration in time range)   iohexol (OMNIPAQUE) 350 MG/ML injection (SINGLE-DOSE) 100 mL (100 mL Intravenous Given 11/16/23 0601)   diltiazem (CARDIZEM) injection 20 mg (20 mg Intravenous Given 11/16/23 0647)   diltiazem (CARDIZEM) tablet 60 mg (60 mg Oral Given 11/16/23 0741)       Diagnostic Studies  Results Reviewed       Procedure Component Value Units Date/Time    HS Troponin I 2hr [[id number]]  (Normal) Collected: 11/16/23 0622    Lab Status: Final result Specimen: Blood from Hand, Left Updated: 11/16/23 0702     hs TnI 2hr 2 ng/L      Delta 2hr hsTnI -1 ng/L     HS Troponin I 4hr [[id number]]     Lab Status: No result Specimen: Blood     hCG, qualitative pregnancy [[id number]]  (Normal) Collected: 11/16/23 0424    Lab Status: Final result Specimen: Blood from Arm, Right Updated: 11/16/23 0507     Preg, Serum Negative    TSH, 3rd generation with Free T4 reflex [[id number]]  (Normal) Collected: 11/16/23 0424    Lab Status: Final result Specimen: Blood from Arm, Right Updated: 11/16/23 0507     TSH 3RD GENERATON 3.524 uIU/mL     HS Troponin 0hr (reflex protocol) [[id number]]  (Normal) Collected: 11/16/23 0424    Lab Status: Final result Specimen: Blood from Arm, Right Updated: 11/16/23 0507     hs TnI 0hr 3 ng/L     Comprehensive metabolic panel [[id number]] Collected: 11/16/23 0424    Lab Status: Final result Specimen: Blood from Arm, Right Updated: 11/16/23 0506     Sodium 138 mmol/L      Potassium 3.7 mmol/L      Chloride 105 mmol/L      CO2 24 mmol/L      ANION GAP 9 mmol/L      BUN 16 mg/dL      Creatinine 0.89 mg/dL      Glucose 112 mg/dL      Calcium 8.7 mg/dL      AST 15 U/L      ALT 12 U/L      Alkaline Phosphatase 52 U/L      Total Protein 7.0 g/dL      Albumin 4.0 g/dL      Total Bilirubin 0.48 mg/dL      eGFR 75 ml/min/1.73sq m     Narrative:      [place] guidelines for Chronic Kidney Disease (CKD):     Stage 1 with normal or high GFR (GFR > 90 mL/min/1.73 square meters)    Stage 2 Mild CKD (GFR = 60-89 mL/min/1.73 square meters)    Stage 3A Moderate CKD (GFR = 45-59 mL/min/1.73 square meters)    Stage 3B Moderate CKD (GFR = 30-44 mL/min/1.73 square meters)    Stage 4 Severe CKD (GFR = 15-29 mL/min/1.73 square meters)    Stage 5 End Stage CKD (GFR <15 mL/min/1.73 square meters)  Note: GFR calculation is accurate only with a steady state creatinine    CBC and differential [[id number]]  (Abnormal) Collected: 11/16/23 0424    Lab Status: Final result Specimen: Blood from Arm, Right Updated: 11/16/23 0432     WBC 7.54 Thousand/uL      RBC 5.15 Million/uL      Hemoglobin 13.9 g/dL      Hematocrit 43.6 %      MCV 85 fL      MCH 27.0 pg      MCHC 31.9 g/dL      RDW 14.4 %      MPV 10.7 fL      Platelets 987 Thousands/uL      nRBC 0 /100 WBCs      Neutrophils Relative 52 %      Immat GRANS % 0 %      Lymphocytes Relative 35 %      Monocytes Relative 7 %      Eosinophils Relative 6 %      Basophils Relative 0 %      Neutrophils Absolute 3.92 Thousands/µL      Immature Grans Absolute 0.02 Thousand/uL      Lymphocytes Absolute 2.60 Thousands/µL      Monocytes Absolute 0.52 Thousand/µL      Eosinophils Absolute 0.45 Thousand/µL      Basophils Absolute 0.03 Thousands/µL     Fingerstick Glucose (POCT) [[id number]]  (Normal) Collected: 11/16/23 0423    Lab Status: Final result Updated: 11/16/23 0425     POC Glucose 102 mg/dl                    CTA dissection protocol chest abdomen pelvis w wo contrast   Final Result by Juana Michaud MD (75/31 [id number])      Normal CT angiography examination of the chest, and pelvis. No acute abnormality identified. Workstation performed: [id number]                    Procedures  Procedures         ED Course  ED Course as of 11/16/23 0755   Thu Nov 16, 2023   0423 Procedure Note: EKG  Date/Time: 11/16/23 4:20 AM   Interpreted by: Nina Ward MD  Indications / Diagnosis: CP  ECG reviewed by me, the ED Physician: yes   The EKG demonstrates:  Atrial fibrillation, rate approximately 112 bpm.  Low voltage QRS.   No significant ST-T wave abnormality, no STEMI.   0451 WBC: 7.54   0451 Hemoglobin: 13.9   0451 Platelet Count: 706   0506 Comprehensive metabolic panel  All WNL   0524 hs TnI 0hr: 3   0525 TSH 3RD GENERATON: 3.524   0525 PREGNANCY, SERUM: Negative   0636 Patient now appears to be in A-fib with RVR, rate in the 140s to 150s.   0704 hs TnI 2hr: 2   0704 Delta 2hr hsTnI: -1   0704 CTA dissection protocol chest abdomen pelvis w wo contrast  Normal CT angiography examination of the chest, and pelvis. No acute abnormality identified. SBIRT 20yo+      Flowsheet Row Most Recent Value   Initial Alcohol Screen: US AUDIT-C     1. How often do you have a drink containing alcohol? 0 Filed at: 11/16/2023 0431   2. How many drinks containing alcohol do you have on a typical day you are drinking? 0 Filed at: 11/16/2023 0431   3b. FEMALE Any Age, or MALE 65+: How often do you have 4 or more drinks on one occassion? 0 Filed at: 11/16/2023 0431   Audit-C Score 0 Filed at: 11/16/2023 5638   LOUIE: How many times in the past year have you. .. Used an illegal drug or used a prescription medication for non-medical reasons? Never Filed at: 11/16/2023 0431                      Medical Decision Making  Amount and/or Complexity of Data Reviewed  Labs: ordered. Decision-making details documented in ED Course. Radiology: ordered. Decision-making details documented in ED Course. Risk  Prescription drug management. Decision regarding hospitalization.              Disposition  Final diagnoses:   Syncope   Atrial fibrillation with rapid ventricular response (HCC)   New onset atrial fibrillation ([address])     Time reflects when diagnosis was documented in both MDM as applicable and the Disposition within this note       Time User Action Codes Description Comment    11/16/2023  7:03 AM Donya Peer Add [R55] Syncope     11/16/2023  7:04 AM Bettejane Peer Add [I48.91] Atrial fibrillation with rapid ventricular response ([address])     11/16/2023  7:53 AM Bettejane Peer Add [I48.91] New onset atrial fibrillation [facility])           ED Disposition       ED Disposition   Admit    Condition   Stable    Date/Time   Thu Nov 16, 2023 9427    Comment   Case was discussed with Dr. Ezra Jean, [facility], and the patient's admission status was agreed to be Admission Status: inpatient status to the service of Dr. Marcia Rosen, [facility]. Follow-up Information    None         Patient's Medications   Discharge Prescriptions    No medications on file       No discharge procedures on file.     PDMP Review       None            ED Provider  Electronically Signed by

## 2023-11-16 NOTE — Clinical Note
Panda Lopezsenias was seen and treated in our emergency department on 11/16/2023. No restrictions    Other - See Comments    N/A    Diagnosis: Syncopal episode, A fib    Rojas Foley  may return to work on return date. She may return on this date: 11/20/2023         If you have any questions or concerns, please don't hesitate to call.       Lydia Prince MD    ______________________________           _______________          _______________  [facility]                              Date                                Time

## 2023-11-17 VITALS
WEIGHT: 170 LBS | OXYGEN SATURATION: 99 % | HEIGHT: 63 IN | BODY MASS INDEX: 30.12 KG/M2 | SYSTOLIC BLOOD PRESSURE: 109 MMHG | HEART RATE: 81 BPM | RESPIRATION RATE: 16 BRPM | TEMPERATURE: 98 F | DIASTOLIC BLOOD PRESSURE: 78 MMHG

## 2023-11-17 LAB
ALBUMIN SERPL BCP-MCNC: 3.8 G/DL (ref 3.5–5)
ALP SERPL-CCNC: 57 U/L (ref 34–104)
ALT SERPL W P-5'-P-CCNC: 12 U/L (ref 7–52)
ANION GAP SERPL CALCULATED.3IONS-SCNC: 7 MMOL/L
AST SERPL W P-5'-P-CCNC: 13 U/L (ref 13–39)
ATRIAL RATE: 125 BPM
BASOPHILS # BLD AUTO: 0.03 THOUSANDS/ÂΜL (ref 0–0.1)
BASOPHILS NFR BLD AUTO: 0 % (ref 0–1)
BILIRUB SERPL-MCNC: 0.48 MG/DL (ref 0.2–1)
BUN SERPL-MCNC: 13 MG/DL (ref 5–25)
CALCIUM SERPL-MCNC: 8.5 MG/DL (ref 8.4–10.2)
CHLORIDE SERPL-SCNC: 108 MMOL/L (ref 96–108)
CO2 SERPL-SCNC: 22 MMOL/L (ref 21–32)
CREAT SERPL-MCNC: 0.75 MG/DL (ref 0.6–1.3)
EOSINOPHIL # BLD AUTO: 0.3 THOUSAND/ÂΜL (ref 0–0.61)
EOSINOPHIL NFR BLD AUTO: 3 % (ref 0–6)
ERYTHROCYTE [DISTWIDTH] IN BLOOD BY AUTOMATED COUNT: 14.7 % (ref 11.6–15.1)
GFR SERPL CREATININE-BSD FRML MDRD: 92 ML/MIN/1.73SQ M
GLUCOSE SERPL-MCNC: 86 MG/DL (ref 65–140)
HCT VFR BLD AUTO: 43.1 % (ref 34.8–46.1)
HGB BLD-MCNC: 13.5 G/DL (ref 11.5–15.4)
IMM GRANULOCYTES # BLD AUTO: 0.02 THOUSAND/UL (ref 0–0.2)
IMM GRANULOCYTES NFR BLD AUTO: 0 % (ref 0–2)
LYMPHOCYTES # BLD AUTO: 2.68 THOUSANDS/ÂΜL (ref 0.6–4.47)
LYMPHOCYTES NFR BLD AUTO: 28 % (ref 14–44)
MAGNESIUM SERPL-MCNC: 3.1 MG/DL (ref 1.9–2.7)
MCH RBC QN AUTO: 27.1 PG (ref 26.8–34.3)
MCHC RBC AUTO-ENTMCNC: 31.3 G/DL (ref 31.4–37.4)
MCV RBC AUTO: 86 FL (ref 82–98)
MONOCYTES # BLD AUTO: 0.71 THOUSAND/ÂΜL (ref 0.17–1.22)
MONOCYTES NFR BLD AUTO: 7 % (ref 4–12)
NEUTROPHILS # BLD AUTO: 5.89 THOUSANDS/ÂΜL (ref 1.85–7.62)
NEUTS SEG NFR BLD AUTO: 62 % (ref 43–75)
NRBC BLD AUTO-RTO: 0 /100 WBCS
PLATELET # BLD AUTO: 333 THOUSANDS/UL (ref 149–390)
PMV BLD AUTO: 10.8 FL (ref 8.9–12.7)
POTASSIUM SERPL-SCNC: 4 MMOL/L (ref 3.5–5.3)
PROT SERPL-MCNC: 6.8 G/DL (ref 6.4–8.4)
QRS AXIS: 45 DEGREES
QRSD INTERVAL: 78 MS
QT INTERVAL: 332 MS
QTC INTERVAL: 453 MS
RBC # BLD AUTO: 4.99 MILLION/UL (ref 3.81–5.12)
SODIUM SERPL-SCNC: 137 MMOL/L (ref 135–147)
T WAVE AXIS: 10 DEGREES
VENTRICULAR RATE: 112 BPM
WBC # BLD AUTO: 9.63 THOUSAND/UL (ref 4.31–10.16)

## 2023-11-17 PROCEDURE — 99239 HOSP IP/OBS DSCHRG MGMT >30: CPT | Performed by: INTERNAL MEDICINE

## 2023-11-17 PROCEDURE — 85025 COMPLETE CBC W/AUTO DIFF WBC: CPT | Performed by: INTERNAL MEDICINE

## 2023-11-17 PROCEDURE — 93010 ELECTROCARDIOGRAM REPORT: CPT | Performed by: INTERNAL MEDICINE

## 2023-11-17 PROCEDURE — 99222 1ST HOSP IP/OBS MODERATE 55: CPT | Performed by: INTERNAL MEDICINE

## 2023-11-17 PROCEDURE — 80053 COMPREHEN METABOLIC PANEL: CPT | Performed by: INTERNAL MEDICINE

## 2023-11-17 PROCEDURE — 83735 ASSAY OF MAGNESIUM: CPT | Performed by: INTERNAL MEDICINE

## 2023-11-17 RX ADMIN — METOPROLOL TARTRATE 25 MG: 25 TABLET, FILM COATED ORAL at 10:41

## 2023-11-17 RX ADMIN — APIXABAN 5 MG: 5 TABLET, FILM COATED ORAL at 17:29

## 2023-11-17 RX ADMIN — APIXABAN 5 MG: 5 TABLET, FILM COATED ORAL at 10:41

## 2023-11-17 RX ADMIN — METOPROLOL TARTRATE 25 MG: 25 TABLET, FILM COATED ORAL at 17:29

## 2023-11-17 NOTE — DISCHARGE SUMMARY
[address] [facility]  Discharge- Sam Arriola 1970, 48 y.o. female MRN: [id number]  Unit/Bed#: -01 Encounter: [id number]  Primary Care Provider: Bobby Ramsey PA-C   Date and time admitted to hospital: 11/16/2023  4:10 AM    * Atrial fibrillation with rapid ventricular response ([address])  Assessment & Plan  Presented with syncopal episode  Noted to have A-fib RVR  New onset A-fib  2D echo reveals EF 62%  Presently on metoprolol 25 mg twice daily  Eliquis for anticoagulation  Discussed with cardiology  Cardiology plans for close outpatient follow-up, event monitoring noted        Syncope  Assessment & Plan  Presents with syncopal episode  Reports similar symptoms while at work few days back  Orthostatics unremarkable  Noted to have new onset A-fib with RVR  Cardiology inputs noted  Cardiology plans for outpatient close follow-up and event monitoring noted    Obesity (BMI 30-39. 9)  Assessment & Plan  Therapeutic lifestyle modification encouraged  Outpatient sleep study recommended    Lumbar spinal stenosis  Assessment & Plan  Physical therapy  Outpatient follow-up    Aortic aneurysm [facility])  Assessment & Plan  Outpatient follow-up            Discharge Summary - [facility]    Patient Information: Sam Arriola 48 y.o. female MRN: [id number]  Unit/Bed#: -Jayla Encounter: [id number]    Discharging Physician / Practitioner: Adolfo Doty MD  PCP: Bobby Ramsey PA-C  Admission Date: 11/16/2023  Discharge Date: 11/17/23    Disposition:     Home    Reason for Admission: Syncope    Discharge Diagnoses:     Principal Problem:    Atrial fibrillation with rapid ventricular response ([address])  Active Problems:    Syncope    Aortic aneurysm (HCC)    Lumbar spinal stenosis    Obesity (BMI 30-39. 9)  Resolved Problems:    * No resolved hospital problems.  *      Consultations During Hospital Stay:  Cardiology    Procedures Performed:     CT chest abdomen pelvis no acute abnormality identified  2D echo EF 62%      Hospital Course:     Yanira Brown is a 48 y.o. female patient who originally presented to the hospital on 11/16/2023 due to syncope. Patient presented with syncope. She also reported similar symptoms few days back while at work. She was admitted. to have A-fib with RVR on admission. She was diagnosed with new onset A-fib. Her orthostatics are unremarkable. She is seen in consultation with cardiology and is recommended metoprolol 25 mg twice daily and Eliquis for anticoagulation. Cardiology plans for close cardiology outpatient follow-up, event monitoring. Patient is symptomatically improving hemodynamically stable and is deemed ready for discharge today. Kindly do the chart for details. Condition at Discharge: fair     Discharge Day Visit / Exam:     Subjective:        Comfortably in bed  Reports feeling okay  Discussed with family at bedside      Vitals: Blood Pressure: 92/63 (11/17/23 1243)  Pulse: 72 (11/17/23 1243)  Temperature: 97.8 °F (36.6 °C) (11/17/23 0728)  Temp Source: Oral (11/17/23 0728)  Respirations: 16 (11/17/23 1115)  Height: 5' 3" (160 cm) (11/16/23 1204)  Weight - Scale: 77.1 kg (170 lb) (11/17/23 0538)  SpO2: 99 % (11/17/23 1115)  Exam:   Physical Exam    Vitals:    11/17/23 1115 11/17/23 1241 11/17/23 1242 11/17/23 1243   BP: 124/69 102/81 93/69 92/63   Pulse: (!) 106 95 93 72   Patient Position - Orthostatic VS: Lying Lying - Orthostatic VS Sitting - Orthostatic VS Standing - Orthostatic VS        Comfortably in bed  Neck supple  Lungs diminished breath sounds at the bases  Vesicular breath sounds  Heart sounds S1 and S2 noted  Irregular  Abdomen soft nontender  Awake and alert obey simple commands  No pedal Ouma  No rash      Discharge instructions/Information to patient and family:   See after visit summary for information provided to patient and family.       Discharge plan discussed with cardiology  Discharge plan discussed with the patient, updated family at bedside  Outpatient close follow-up with cardiology  Follow-up with primary care physician    Provisions for Follow-Up Care:  See after visit summary for information related to follow-up care and any pertinent home health orders. Planned Readmission: no     Discharge Statement:  I spent 42 minutes discharging the patient. This time was spent on the day of discharge. I had direct contact with the patient on the day of discharge. Greater than 50% of the total time was spent examining patient, answering all patient questions, arranging and discussing plan of care with patient as well as directly providing post-discharge instructions. Additional time then spent on discharge activities. Discharge Medications:  See after visit summary for reconciled discharge medications provided to patient and family.       ** Please Note: This note has been constructed using a voice recognition system **

## 2023-11-17 NOTE — UTILIZATION REVIEW
Initial Clinical Review    Admission: Date/Time/Statement:   Admission Orders (From admission, onward)       Ordered        11/16/23 0754  INPATIENT ADMISSION  Once                          Orders Placed This Encounter   Procedures    INPATIENT ADMISSION     Standing Status:   Standing     Number of Occurrences:   1     Order Specific Question:   Level of Care     Answer:   Med Surg [16]     Order Specific Question:   Estimated length of stay     Answer:   More than 2 Midnights     Order Specific Question:   Certification     Answer:   I certify that inpatient services are medically necessary for this patient for a duration of greater than two midnights. See H&P and MD Progress Notes for additional information about the patient's course of treatment. ED Arrival Information       Expected   -    Arrival   11/16/2023 04:09    Acuity   Urgent              Means of arrival   Ambulance    Escorted by   [facility]    Service   Hospitalist    Admission type   Emergency              Arrival complaint   -             Chief Complaint   Patient presents with    Medical Problem     Pt arrived via ems per ems pt was found by significant other " unresponsive". Pt A/OX4 upon arrival. Pt states passing out this morning in bathroom ,denies weakness, numbness,tingling   hx of passing out at work. +chest pain. Denies sob,nvd. 324 of Asprin given ems. Initial Presentation: 48 y.o. female presented to ED with reported syncopal episode this AM without any significant prodrome, she was not confused after the event did not have any urinary or bowel incontinence. 2 prior episodes occurring this year while at work, during 1 of those episodes she felt some palpitations prior and some lightheadedness. On presentation found to be in A-fib with RVR. Given diltiazem bolus in the ED and responded well. Admitted inpatient to [facility] unit with A-fib with RVR -- telemetry. Hold on further diltiazem until echo done. Echo ordered. Started on lopressor 25 mg BID, continue eliquis 5 mg BID. PT/OT judy. Consult cardiology    Date: 11/17   Day 2:   Cardiology consult -- New onset atrial fibrillation with RVR, Hypermagnesemia, Syncope -- S/p IV Cardizem bolus 20 + p.o. Cardizem 60 + p.o. metoprolol 25 yesterday morning --> HR had been in 70-90 all night. But then she did not get any doses of metoprolol overnight or this morning d/t lower BP --> HR now up to 120-150. Give metoprolol 25 mg now and continue metoprolol 25 mg 2x/day. If HR improves with this then she would be okay for discharge later today as long as she is asymptomatic. If HR remains elevated she will need further uptitration of metoprolol to metoprolol 50 mg 2x/day. Continue Eliquis 5 mg twice daily. She received IV 4g and p.o. 400 mg bid mag yesterday and her mag level is up to 3.1 today --> unclear why she received mag doses --> stop further magnesium suppl.       ED Triage Vitals   Temperature Pulse Respirations Blood Pressure SpO2   11/16/23 0417 11/16/23 0417 11/16/23 0417 11/16/23 0428 11/16/23 0417   97.9 °F (36.6 °C) 100 18 113/58 100 %      Temp Source Heart Rate Source Patient Position - Orthostatic VS BP Location FiO2 (%)   11/16/23 0417 11/16/23 0417 11/16/23 0844 11/16/23 0428 --   Oral Monitor Lying Left arm       Pain Score       11/16/23 0844       No Pain          Wt Readings from Last 1 Encounters:   11/17/23 77.1 kg (170 lb)     Additional Vital Signs:   Date/Time Temp Pulse Resp BP MAP (mmHg) SpO2 O2 Device Patient Position - Orthostatic VS   11/17/23 1243 -- 72 -- 92/63 -- -- -- Standing - Orthostatic VS   11/17/23 1242 -- 93 -- 93/69 -- -- -- Sitting - Orthostatic VS   11/17/23 1241 -- 95 -- 102/81 -- -- -- Lying - Orthostatic VS   11/17/23 1115 -- 106 Abnormal  16 124/69 -- 99 % -- Lying   11/17/23 1041 -- 126 Abnormal  -- 128/79 -- -- -- --   11/17/23 0728 97.8 °F (36.6 °C) 65 16 107/77 -- 98 % -- Lying   11/17/23 0305 -- 90 18 125/72 101 100 % None (Room air) Lying   11/16/23 2334 97.8 °F (36.6 °C) 84 20 97/80 85 98 % None (Room air) Lying   11/16/23 2124 97.9 °F (36.6 °C) 73 20 104/69 -- 99 % None (Room air) Lying   11/16/23 1844 97.8 °F (36.6 °C) 86 20 98/57 71 98 % None (Room air) Lying   11/16/23 1517 97.8 °F (36.6 °C) 69 18 114/70 84 98 % None (Room air) Lying   11/16/23 1317 -- 96 -- 115/54 -- -- -- --   11/16/23 1204 -- 59 -- 132/74 -- -- -- --   11/16/23 0914 98 °F (36.7 °C) 59 16 132/74 -- 95 % -- Lying   11/16/23 0844 -- 82 19 105/67 -- 98 % None (Room air) Lying   11/16/23 0800 -- 82 20 106/72 85 100 % -- --   11/16/23 0741 -- -- -- 108/80 -- -- -- --   11/16/23 0700 -- 65 20 107/66 81 98 % -- --   11/16/23 0639 -- 107 Abnormal  -- -- -- -- -- --   11/16/23 0636 -- 163 Abnormal   20 -- -- -- -- --   Pulse: Md Carie benton. at 11/16/23 0636   11/16/23 0615 -- 98 14 -- -- 100 % -- --   11/16/23 0515 -- 99 18 -- -- 98 % -- --   11/16/23 0445 -- 100 18 91/52 64 Abnormal  100 % -- --   11/16/23 0435 -- -- -- -- -- -- None (Room air) --   11/16/23 0428 -- -- -- 113/58 -- -- -- --   11/16/23 0417 97.9 °F (36.6 °C) 100 18 -- -- 100 % None (Room air) --     Pertinent Labs/Diagnostic Test Results:   CTA dissection protocol chest abdomen pelvis w wo contrast   Final Result by Coco West MD (56/31 2373)      Normal CT angiography examination of the chest, and pelvis. No acute abnormality identified. Echo 11/16:  Interpretation Summary     Left Ventricle: Left ventricular cavity size is normal. Wall thickness is normal. The left ventricular ejection fraction is 62%. Systolic function is normal. Wall motion is normal.    Tricuspid Valve: There is mild regurgitation. Aorta: The aortic root is normal in size. The ascending aorta is borderline dilated. The ascending aorta is 3.6 cm (2 cm/m2). Prior TTE study available for comparison. Prior study date: 8/24/2022. No significant changes noted compared to the prior study.          Results from last 7 days Lab Units 11/17/23  0502 11/16/23  0424   WBC Thousand/uL 9.63 7.54   HEMOGLOBIN g/dL 13.5 13.9   HEMATOCRIT % 43.1 43.6   PLATELETS Thousands/uL 333 333   NEUTROS ABS Thousands/µL 5.89 3.92     Results from last 7 days   Lab Units 11/17/23  0502 11/16/23  0424   SODIUM mmol/L 137 138   POTASSIUM mmol/L 4.0 3.7   CHLORIDE mmol/L 108 105   CO2 mmol/L 22 24   ANION GAP mmol/L 7 9   BUN mg/dL 13 16   CREATININE mg/dL 0.75 0.89   EGFR ml/min/1.73sq m 92 75   CALCIUM mg/dL 8.5 8.7   MAGNESIUM mg/dL 3.1*  --      Results from last 7 days   Lab Units 11/17/23  0502 11/16/23  0424   AST U/L 13 15   ALT U/L 12 12   ALK PHOS U/L 57 52   TOTAL PROTEIN g/dL 6.8 7.0   ALBUMIN g/dL 3.8 4.0   TOTAL BILIRUBIN mg/dL 0.48 0.48     Results from last 7 days   Lab Units 11/16/23  0423   POC GLUCOSE mg/dl 102     Results from last 7 days   Lab Units 11/17/23  0502 11/16/23  0424   GLUCOSE RANDOM mg/dL 86 112     Results from last 7 days   Lab Units 11/16/23  0835 11/16/23  0622 11/16/23 0424   HS TNI 0HR ng/L  --   --  3   HS TNI 2HR ng/L  --  2  --    HSTNI D2 ng/L  --  -1  --    HS TNI 4HR ng/L 2  --   --    HSTNI D4 ng/L -1  --   --      Results from last 7 days   Lab Units 11/16/23  0424   TSH 3RD GENERATON uIU/mL 3.524       ED Treatment:   Medication Administration from 11/16/2023 0409 to 11/16/2023 0912         Date/Time Order Dose Route Action     11/16/2023 0647 EST diltiazem (CARDIZEM) injection 20 mg 20 mg Intravenous Given     11/16/2023 0741 EST diltiazem (CARDIZEM) tablet 60 mg 60 mg Oral Given     11/16/2023 0807 EST magnesium sulfate 2 g/50 mL IVPB (premix) 2 g 2 g Intravenous New Bag       Past Medical History:   Diagnosis Date    Anemia     Aortic aneurysm (HCC)     Basal cell carcinoma     Cancer (HCC)     basal cell skin cancer    Eosinophilic esophagitis     Gastritis     GERD (gastroesophageal reflux disease)     History of breast problem      Present on Admission:   Syncope   Aortic aneurysm (HCC)   Lumbar spinal stenosis   Obesity (BMI 30-39. 9)      Admitting Diagnosis: Syncope [R55]  New onset atrial fibrillation (HCC) [I48.91]  Atrial fibrillation with rapid ventricular response (HCC) [I48.91]  Known medical problems [Z78.9]  Age/Sex: 48 y.o. female  Admission Orders:  Scheduled Medications:  apixaban, 5 mg, Oral, BID  metoprolol tartrate, 25 mg, Oral, Q12H [facility]        Network Utilization Review Department  ATTENTION: Please call with any questions or concerns to [phone] and carefully listen to the prompts so that you are directed to the right person. All voicemails are confidential.   For Discharge needs, contact Care Management DC Support Team at [phone] opt. [phone]  Send all requests for admission clinical reviews, approved or denied determinations and any other requests to dedicated fax number below belonging to the campus where the patient is receiving treatment.  List of dedicated fax numbers for the Facilities:  [place] DENIALS (Administrative/Medical Necessity) [phone]   DISCHARGE SUPPORT TEAM (NETWORK) [address]ell (Maternity/NICU/Pediatrics) [phone]   [address] [facility] [phone]   [facility] [address] [address] [facility] [address] [facility]  [address] [phone]

## 2023-11-17 NOTE — PLAN OF CARE

## 2023-11-17 NOTE — ASSESSMENT & PLAN NOTE
Presents with syncopal episode  Reports similar symptoms while at work few days back  Orthostatics unremarkable  Noted to have new onset A-fib with RVR  Cardiology inputs noted  Cardiology plans for outpatient close follow-up and event monitoring noted

## 2023-11-17 NOTE — CONSULTS
[facility]                                              Cardiology Consult  Deirdre Helm 48 y.o. female   YOB: 1970 MRN: [id number]  Unit/Bed#: -Jayla Encounter: [id number]      Physician Requesting Consult: Benjamin Key, *  Reason for Consult / Principal Problem: Atrial fibrillation    Assessments  Principal Problem:    Atrial fibrillation with rapid ventricular response ([address])  Active Problems:    Syncope    Aortic aneurysm (HCC)    Lumbar spinal stenosis    Obesity (BMI 30-39.9)    Outpatient cardiologist - Dr. Jessica Aguilera [facility])    Plan  New onset atrial fibrillation with RVR, Hypermagnesemia  Noted at presentation, although she did not really have any palpitations or symptoms with it  She did present with syncope though, which may at least in part be related to the rapid A-fib causing hypotension  S/p IV Cardizem bolus 20 + p.o.  Cardizem 60 + p.o. metoprolol 25 yesterday morning --> HR had been in 70-90 all night  But then she did not get any doses of metoprolol overnight or this morning due to lower BP --> HR now up to 120-150  Give metoprolol 25 mg now and continue metoprolol 25 mg twice daily  If heart rate improves with this then she would be okay for discharge later today as long as she is asymptomatic  If HR remains elevated she will need further uptitration of metoprolol to metoprolol 50 mg twice daily  Continue Eliquis 5 mg twice daily  She received IV 4g and p.o. 400 mg bid magnesium yesterday and her magnesium level is up to 3.1 today --> unclear why she received magnesium doses --> stop further magnesium supplementation    Syncope  Unclear etiology  Occurred within minutes of getting up and walking to  ?related to orthostatic hypotension in the setting of new Afib  No other symptoms reported before or after her episode  Check orthostatic vital signs and re-evaluate symptoms with ambulation  Given the sudden syncope, without warnings signs, and her h/o intermittent chest pain - additional arrhythmia remains a possibility but has not been found on telemetry here so far --> recommend 1 month outpatient cardiac event monitor --> she will have this arranged through her cardiologist at [address] [facility] with primary medical service and coordinated care with nursing. If she is asymptomatic with ambulation, and her heart rates are consistently less than 100 after medical therapy, then can consider discharge later in the afternoon on current medical therapy. She will follow-up with her cardiologist within the next week. I have asked her  to call the cardiology office to get an earlier appointment prior to discharge    ECG: Personally reviewed. Atrial fibrillation with RVR  Telemetry: Personally reviewed. Afib, HR 70-90 yesterday, but this morning is up to 120-160    History of Present Illness   HPI: Adam Orozco is a 48y.o. year old female who presents with a syncopal fall at home. She was apparently in her usual state of health and woke up in the morning and went to go to the shower. Within minutes of standing up and before getting into the shower, she passed out and fell onto her back. Her  heard her fall and quickly helped her. She briefly lost consciousness, but then returned back. EMS was called and she was brought into the ED in atrial fibrillation. On arrival she was noted to be in rapid atrial fibrillation and received doses of Cardizem and was recommended admission for further evaluation and treatment. She has been feeling well since arrival and has not had any further issues with dizziness or lightheadedness, palpitations and is overall feeling well today.       Past Medical History:   Diagnosis Date    Anemia     Aortic aneurysm (HCC)     Basal cell carcinoma     Cancer (HCC)     basal cell skin cancer    Eosinophilic esophagitis     Gastritis     GERD (gastroesophageal reflux disease)     History of breast problem      Past Surgical History:   Procedure Laterality Date    BASAL CELL CARCINOMA EXCISION  2018    Dr Jessica Mcdermott Right     Right ear    BREAST BIOPSY      X3    BREAST SURGERY      BREAST SURGERY Right     Right breast lump removal    COLONOSCOPY      MAMMO (HISTORICAL)  2017    Mammo/U/S 11/2017 stable; next mammo 1 year    REVISION OF SCAR  09/2019    Excision of Painful scar Right retroauricular area - Dr. Clementina Parisi       Family History   Problem Relation Age of Onset    Heart disease Mother     Stroke Mother     Lung cancer Father      Meds/Allergies   all current active meds have been reviewed and current meds:   Current Facility-Administered Medications   Medication Dose Route Frequency    apixaban (ELIQUIS) tablet 5 mg  5 mg Oral BID    magnesium Oxide (MAG-OX) tablet 400 mg  400 mg Oral BID    metoprolol tartrate (LOPRESSOR) tablet 25 mg  25 mg Oral Q12H [address]     Medications Prior to Admission   Medication    acetaminophen (TYLENOL) 325 mg tablet    esomeprazole (NexIUM) 40 mg packet    ferrous sulfate 325 (65 Fe) mg tablet    fluticasone (FLOVENT HFA) 220 mcg/act inhaler    ibuprofen (MOTRIN) 200 mg tablet    Multiple Vitamin (multivitamin) tablet    Na Sulfate-K Sulfate-Mg Sulf (Suprep Bowel Prep Kit) 17.5-3.13-1.6 GM/177ML SOLN     No Known Allergies  Social History     Socioeconomic History    Marital status: /Civil Union     Spouse name: None    Number of children: None    Years of education: None    Highest education level: None   Occupational History    Occupation: office products   Tobacco Use    Smoking status: Never    Smokeless tobacco: Never   Vaping Use    Vaping Use: Never used   Substance and Sexual Activity    Alcohol use: Not Currently    Drug use: Never    Sexual activity: Yes     Partners: Male     Birth control/protection: None   Other Topics Concern    None   Social History Narrative    ** Merged History Encounter **         Most recent tobacco use screenin2019  Do you currently or have you served in the [address] [place] [address] Street: No  Occupation: office products  Marital status:   Sexual orientation: Heterosexual  Diet: Regular  General stress level: Medium  Alcohol in    take: Occasional  Caffeine intake: Moderate  Chewing tobacco: none  Illicit drugs: none  Guns present in home: No  Seat belts used routinely: Yes  Sunscreen used routinely: Yes  Smoke alarm in home: Yes  Advance directive: No 10/2/19 JS  Live alone or wi    th others: with others  Are there stairs in your home: Yes  Pets: Yes     Social Determinants of Health     Financial Resource Strain: Not on file   Food Insecurity: Not on file   Transportation Needs: Not on file   Physical Activity: Not on file   Stress: Not on file   Social Connections: Not on file   Intimate Partner Violence: Not on file   Housing Stability: Not on file         Review of Systems   All other systems reviewed and are negative. Vitals:    23 2334 23 0305 23 0538 23 0728   BP: 97/80 125/72  107/77   BP Location: Left arm Left arm  Left arm   Pulse: 84 90  65   Resp: 20 18  16   Temp: 97.8 °F (36.6 °C)   97.8 °F (36.6 °C)   TempSrc: Oral   Oral   SpO2: 98% 100%  98%   Weight:   77.1 kg (170 lb)    Height:         Orthostatic Blood Pressures      Flowsheet Row Most Recent Value   Blood Pressure 107/77 filed at 2023 8872   Patient Position - Orthostatic VS Lying filed at 2023 0728          Body mass index is 30.11 kg/m². Wt Readings from Last 5 Encounters:   23 77.1 kg (170 lb)   23 78.6 kg (173 lb 3.2 oz)   23 82.8 kg (182 lb 8.7 oz)   22 76.6 kg (168 lb 14 oz)   21 71.7 kg (158 lb)     No intake/output data recorded. Physical Exam  Vitals and nursing note reviewed. Constitutional:       General: She is not in acute distress. Appearance: Normal appearance. She is well-developed. She is not ill-appearing.    HENT:      Head: Normocephalic and atraumatic. Nose: No congestion. Eyes:      General: No scleral icterus. Conjunctiva/sclera: Conjunctivae normal.   Neck:      Vascular: No carotid bruit or JVD. Cardiovascular:      Rate and Rhythm: Tachycardia present. Rhythm irregular. Pulses: Normal pulses. Heart sounds: Normal heart sounds. No murmur heard. No friction rub. No gallop. Pulmonary:      Effort: Pulmonary effort is normal. No respiratory distress. Breath sounds: Normal breath sounds. No rales. Abdominal:      General: There is no distension. Palpations: Abdomen is soft. Tenderness: There is no abdominal tenderness. Musculoskeletal:         General: No swelling or tenderness. Cervical back: Neck supple. Right lower leg: No edema. Left lower leg: No edema. Skin:     General: Skin is warm. Neurological:      General: No focal deficit present. Mental Status: She is alert and oriented to person, place, and time. Mental status is at baseline. Psychiatric:         Mood and Affect: Mood normal.         Behavior: Behavior normal.         Thought Content: Thought content normal.           Labs:  Results from last 7 days   Lab Units 11/17/23  0502 11/16/23  0424   WBC Thousand/uL 9.63 7.54   HEMOGLOBIN g/dL 13.5 13.9   HEMATOCRIT % 43.1 43.6   RDW % 14.7 14.4   PLATELETS Thousands/uL 333 333     Results from last 7 days   Lab Units 11/17/23  0502 11/16/23  0424   POTASSIUM mmol/L 4.0 3.7   CHLORIDE mmol/L 108 105   CO2 mmol/L 22 24   MAGNESIUM mg/dL 3.1*  --    BUN mg/dL 13 16   CREATININE mg/dL 0.75 0.89   CALCIUM mg/dL 8.5 8.7   AST U/L 13 15   ALT U/L 12 12   ALK PHOS U/L 57 52                         Imaging: Echo complete w/ contrast if indicated    Result Date: 11/16/2023  Narrative:   Left Ventricle: Left ventricular cavity size is normal. Wall thickness is normal. The left ventricular ejection fraction is 62%.  Systolic function is normal. Wall motion is normal. Tricuspid Valve: There is mild regurgitation. Aorta: The aortic root is normal in size. The ascending aorta is borderline dilated. The ascending aorta is 3.6 cm (2 cm/m2). Prior TTE study available for comparison. Prior study date: 8/24/2022. No significant changes noted compared to the prior study. CTA dissection protocol chest abdomen pelvis w wo contrast    Result Date: 11/16/2023  Narrative: CTA - CHEST, ABDOMEN AND PELVIS - WITHOUT AND WITH IV CONTRAST INDICATION:   Chest pain, syncopal episode, hx of thoracic aortic aneurysm. Found unresponsive by significant other. Passed out this morning in the bathroom. History of passing out at work. Chest pain. COMPARISON:  6/4/2022 CT of the chest, abdomen and pelvis. TECHNIQUE: CT examination of the chest, abdomen and pelvis was performed both prior to and after the administration of intravenous contrast.  The noncontrast portion of this examination was performed utilizing low radiation dose technique. Thin section  angiographic arterial phase post contrast technique was used in order to evaluate for aortic dissection. 3D reformatted images and volume rendering were performed on an independent workstation. Additionally, axial, sagittal, and coronal 2D reformatted  images were created from the source data and submitted for interpretation. Radiation dose length product (DLP) for this visit:  1055.7 mGy-cm . This examination, like all CT scans performed in the [facility], was performed utilizing techniques to minimize radiation dose exposure, including the use of iterative  reconstruction and automated exposure control. IV Contrast:  100 mL of iohexol (OMNIPAQUE) Enteric Contrast:  Enteric contrast was not administered. FINDINGS: AORTA: Normal aortic caliber and enhancement. Ascending aorta is 3.4 cm, measured in both the axial and sagittal oblique planes. No intramural hematoma. Chest: Standard aortic arch branch pattern.  Patent carotid branches and visualized vertebral arteries. Patent left subclavian artery. Right subclavian artery is obscured by artifact. Abdomen: Celiac artery and branches, superior and inferior mesenteric arteries are patent. Early bifurcation of the right renal artery. Otherwise bilateral single renal arteries are patent. Both common, external and internal iliac arteries and visualized femoral arteries are patent. CHEST LUNGS: Mild dependent atelectasis, otherwise clear. Patent central airways. PLEURA: Within normal limits. HEART/PULMONARY ARTERIAL TREE: Normal heart size. Patent pulmonary arteries. MEDIASTINUM AND EDMUND: Within normal limits. CHEST WALL AND LOWER NECK:   Within normal limits. ABDOMEN LIVER/BILIARY TREE: Within normal limits. GALLBLADDER: Within normal limits. SPLEEN: Within normal limits. PANCREAS: Within normal limits. ADRENAL GLANDS: Within normal limits. KIDNEYS/URETERS: Within normal limits. STOMACH AND BOWEL: Within normal limits. APPENDIX:  No evidence of appendicitis. ABDOMINOPELVIC CAVITY: No abnormal air, fluid or lymphadenopathy. PELVIS REPRODUCTIVE ORGANS: Within normal limits for patient's age. URINARY BLADDER: Within normal limits. ABDOMINAL WALL/INGUINAL REGIONS: Within normal limits. OSSEOUS STRUCTURES: Posterior L4-L5 fusion and L4-L5 grade 1 anterolisthesis. No evidence of hardware failure. Mild degenerative changes. Impression: Normal CT angiography examination of the chest, and pelvis. No acute abnormality identified. Workstation performed: [id number]     XR spine lumbar 2 or 3 views injury    Result Date: 11/4/2023  Narrative: Study: 3 views limited lumbar spine. COMPARISON: August 18, 2023. HISTORY: Follow-up post spinal fusion. 5 lumbar segments in anatomic alignment position status post posterior fusion L4-5 with no interval disruption to fixation hardware. Persistent mild anterolisthesis of L4 on L5, 8 mm. Posterior listhesis L5 on S1, 6 mm.  Subtle disc space narrowing L3-4 unchanged, as well as L5-S1. Narrowing and sclerosis L5-S1 apophyseal joints. SI joints symmetrically patent. Impression: IMPRESSION: Stable lumbar spine status post posterior fusion L4-5. Disc space narrowing L3-4 and L5-S1 unchanged from earlier study as well as posterior arthropathy L5-S1. Workstation:[id number]      Cardiac testing:   No results found for this or any previous visit. No results found for this or any previous visit. No results found for this or any previous visit. No results found for this or any previous visit.

## 2023-11-17 NOTE — ASSESSMENT & PLAN NOTE
Presented with syncopal episode  Noted to have A-fib RVR  New onset A-fib  2D echo reveals EF 62%  Presently on metoprolol 25 mg twice daily  Eliquis for anticoagulation  Discussed with cardiology  Cardiology plans for close outpatient follow-up, event monitoring noted

## 2023-11-17 NOTE — UTILIZATION REVIEW
NOTIFICATION OF INPATIENT ADMISSION   AUTHORIZATION REQUEST   SERVICING FACILITY:   [address] [place] [address]  [facility], [address] [place] [address]  Tax ID: [id number]  NPI: [id number] ATTENDING PROVIDER:  Attending Name and NPI#: Kamron Salgado Md [[id number]]  Address: [address] [place] [address]), [address] [place] [address]  Phone:  [phone]     ADMISSION INFORMATION:  Place of Service: [facility]   Place of Service Code: 21  Inpatient Admission Date/Time: 11/16/23  7:54 AM  Discharge Date/Time: No discharge date for patient encounter. Admitting Diagnosis Code/Description:  Syncope [R55]  New onset atrial fibrillation ([address]) [I48.91]  Atrial fibrillation with rapid ventricular response ([address]) [I48.91]  Known medical problems [Z78.9]     UTILIZATION REVIEW CONTACT:  Mindi Barrett Utilization   Network Utilization Review Department  Phone: [phone]  Fax: [phone]  Email: [email or web address]  Contact for approvals/pending authorizations, clinical reviews, and discharge. PHYSICIAN ADVISORY SERVICES:  Medical Necessity Denial & [facility] Review  Phone: [phone]  Fax: [phone]  Email: [email or web address]     DISCHARGE SUPPORT TEAM:  For Patients Discharge Needs & Updates  Phone: [phone] opt. 2 Fax: [phone]  Email: [email or web address]

## 2023-11-17 NOTE — PLAN OF CARE
Problem: Potential for Falls  Goal: Patient will remain free of falls  Description: INTERVENTIONS:  - Educate patient/family on patient safety including physical limitations  - Instruct patient to call for assistance with activity   - Consult OT/PT to assist with strengthening/mobility   - Keep Call bell within reach  - Keep bed low and locked with side rails adjusted as appropriate  - Keep care items and personal belongings within reach  - Initiate and maintain comfort rounds  - Make Fall Risk Sign visible to staff  - Apply yellow socks and bracelet for high fall risk patients  - Consider moving patient to room near nurses station  Outcome: Progressing     Problem: PAIN - ADULT  Goal: Verbalizes/displays adequate comfort level or baseline comfort level  Description: Interventions:  - Encourage patient to monitor pain and request assistance  - Assess pain using appropriate pain scale  - Administer analgesics based on type and severity of pain and evaluate response  - Implement non-pharmacological measures as appropriate and evaluate response  - Consider cultural and social influences on pain and pain management  - Notify physician/advanced practitioner if interventions unsuccessful or patient reports new pain  Outcome: Progressing     Problem: INFECTION - ADULT  Goal: Absence or prevention of progression during hospitalization  Description: INTERVENTIONS:  - Assess and monitor for signs and symptoms of infection  - Monitor lab/diagnostic results  - Monitor all insertion sites, i.e. indwelling lines, tubes, and drains  - Monitor endotracheal if appropriate and nasal secretions for changes in amount and color  - Manter appropriate cooling/warming therapies per order  - Administer medications as ordered  - Instruct and encourage patient and family to use good hand hygiene technique  - Identify and instruct in appropriate isolation precautions for identified infection/condition  Outcome: Progressing

## 2023-11-20 NOTE — UTILIZATION REVIEW
NOTIFICATION OF ADMISSION DISCHARGE   This is a Notification of Discharge from [address] [facility]. Please be advised that this patient has been discharge from our facility. Below you will find the admission and discharge date and time including the patient’s disposition. UTILIZATION REVIEW CONTACT:  Alda Camarillo  Utilization   Network Utilization Review Department  Phone: [phone] x carefully listen to the prompts. All voicemails are confidential.  Email: [email or web address]     ADMISSION INFORMATION  PRESENTATION DATE: 11/16/2023  4:10 AM  OBERVATION ADMISSION DATE:   INPATIENT ADMISSION DATE: 11/16/23  7:54 AM   DISCHARGE DATE: 11/17/2023  5:44 PM   DISPOSITION:Home/Self Care    Network Utilization Review Department  ATTENTION: Please call with any questions or concerns to [phone] and carefully listen to the prompts so that you are directed to the right person. All voicemails are confidential.   For Discharge needs, contact Care Management DC Support Team at [phone] opt. 2  Send all requests for admission clinical reviews, approved or denied determinations and any other requests to dedicated fax number below belonging to the campus where the patient is receiving treatment.  List of dedicated fax numbers for the Facilities:  [place] DENIALS (Administrative/Medical Necessity) [phone]   DISCHARGE SUPPORT TEAM (Network) [phone] [address] [facility] (Maternity/NICU/Pediatrics) [phone] [address] [facility] [address] [place] [address] [address] [phone]

## 2024-10-16 ENCOUNTER — OFFICE VISIT (OUTPATIENT)
Dept: CARDIOLOGY CLINIC | Facility: CLINIC | Age: 54
End: 2024-10-16
Payer: COMMERCIAL

## 2024-10-16 VITALS
BODY MASS INDEX: 30.48 KG/M2 | HEART RATE: 58 BPM | TEMPERATURE: 97.6 F | SYSTOLIC BLOOD PRESSURE: 108 MMHG | WEIGHT: 172 LBS | OXYGEN SATURATION: 99 % | HEIGHT: 63 IN | DIASTOLIC BLOOD PRESSURE: 74 MMHG

## 2024-10-16 DIAGNOSIS — E78.00 PURE HYPERCHOLESTEROLEMIA: ICD-10-CM

## 2024-10-16 DIAGNOSIS — D50.9 IRON DEFICIENCY ANEMIA, UNSPECIFIED IRON DEFICIENCY ANEMIA TYPE: ICD-10-CM

## 2024-10-16 DIAGNOSIS — R00.2 PALPITATIONS: ICD-10-CM

## 2024-10-16 DIAGNOSIS — R06.02 SHORTNESS OF BREATH: Primary | ICD-10-CM

## 2024-10-16 DIAGNOSIS — R07.89 CHEST TIGHTNESS: ICD-10-CM

## 2024-10-16 DIAGNOSIS — I48.0 PAROXYSMAL ATRIAL FIBRILLATION (HCC): ICD-10-CM

## 2024-10-16 DIAGNOSIS — E66.09 CLASS 1 OBESITY DUE TO EXCESS CALORIES WITH SERIOUS COMORBIDITY AND BODY MASS INDEX (BMI) OF 30.0 TO 30.9 IN ADULT: ICD-10-CM

## 2024-10-16 DIAGNOSIS — E66.811 CLASS 1 OBESITY DUE TO EXCESS CALORIES WITH SERIOUS COMORBIDITY AND BODY MASS INDEX (BMI) OF 30.0 TO 30.9 IN ADULT: ICD-10-CM

## 2024-10-16 PROCEDURE — 99214 OFFICE O/P EST MOD 30 MIN: CPT | Performed by: INTERNAL MEDICINE

## 2024-10-16 PROCEDURE — 93000 ELECTROCARDIOGRAM COMPLETE: CPT | Performed by: INTERNAL MEDICINE

## 2024-10-16 RX ORDER — FLUTICASONE PROPIONATE 50 MCG
2 SPRAY, SUSPENSION (ML) NASAL DAILY
COMMUNITY
Start: 2024-09-16

## 2024-10-16 RX ORDER — LORATADINE 10 MG/1
10 TABLET ORAL DAILY
COMMUNITY
Start: 2023-11-22 | End: 2024-11-21

## 2024-10-16 RX ORDER — AZELASTINE HYDROCHLORIDE 137 UG/1
SPRAY, METERED NASAL
COMMUNITY
Start: 2024-08-03

## 2024-10-16 NOTE — ASSESSMENT & PLAN NOTE
11/2023: Single known episode of A-fib   Treated with metoprolol and Eliquis  10/2024: remains NSR on ECG today  Continue metoprolol 25 mg bid, eliquis
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
## 2024-10-19 DIAGNOSIS — E78.00 PURE HYPERCHOLESTEROLEMIA: Primary | ICD-10-CM

## 2024-10-19 LAB
ALBUMIN SERPL-MCNC: 3.9 G/DL (ref 3.5–5.7)
ALP SERPL-CCNC: 54 U/L (ref 35–120)
ALT SERPL-CCNC: 10 U/L
ANION GAP SERPL CALCULATED.3IONS-SCNC: 7 MMOL/L (ref 3–11)
AST SERPL-CCNC: 13 U/L
BILIRUB SERPL-MCNC: 0.6 MG/DL (ref 0.2–1)
BUN SERPL-MCNC: 15 MG/DL (ref 7–25)
CALCIUM SERPL-MCNC: 9 MG/DL (ref 8.5–10.1)
CHLORIDE SERPL-SCNC: 106 MMOL/L (ref 100–109)
CHOLEST SERPL-MCNC: 254 MG/DL
CHOLEST/HDLC SERPL: 4 {RATIO}
CO2 SERPL-SCNC: 28 MMOL/L (ref 21–31)
CREAT SERPL-MCNC: 0.84 MG/DL (ref 0.4–1.1)
CYTOLOGY CMNT CVX/VAG CYTO-IMP: NORMAL
ERYTHROCYTE [DISTWIDTH] IN BLOOD BY AUTOMATED COUNT: 13.3 % (ref 12–16)
GFR/BSA.PRED SERPLBLD CYS-BASED-ARV: 83 ML/MIN/{1.73_M2}
GLUCOSE SERPL-MCNC: 87 MG/DL (ref 65–99)
HCT VFR BLD AUTO: 38.5 % (ref 35–43)
HDLC SERPL-MCNC: 63 MG/DL (ref 23–92)
HGB BLD-MCNC: 13 G/DL (ref 11.5–14.5)
IRON SATN MFR SERPL: 47 % (ref 20–50)
IRON SERPL-MCNC: 150 UG/DL (ref 50–212)
LDLC SERPL CALC-MCNC: 172 MG/DL
MCH RBC QN AUTO: 29.1 PG (ref 26–34)
MCHC RBC AUTO-ENTMCNC: 33.7 G/DL (ref 32–37)
MCV RBC AUTO: 86 FL (ref 80–100)
NONHDLC SERPL-MCNC: 191 MG/DL
PLATELET # BLD AUTO: 302 THOU/CMM (ref 140–350)
PMV BLD REES-ECKER: 9.4 FL (ref 7.5–11.3)
POTASSIUM SERPL-SCNC: 4.6 MMOL/L (ref 3.5–5.2)
PROT SERPL-MCNC: 6.6 G/DL (ref 6.3–8.3)
RBC # BLD AUTO: 4.46 MILL/CMM (ref 3.7–4.7)
SODIUM SERPL-SCNC: 141 MMOL/L (ref 135–145)
TIBC SERPL-MCNC: 322 UG/DL (ref 260–430)
TRANSFERRIN SERPL-MCNC: 230 MG/DL (ref 203–362)
TRIGL SERPL-MCNC: 93 MG/DL
WBC # BLD AUTO: 6.6 THOU/CMM (ref 4–10)

## 2024-10-21 ENCOUNTER — HOSPITAL ENCOUNTER (EMERGENCY)
Facility: HOSPITAL | Age: 54
Discharge: HOME | End: 2024-10-21
Attending: EMERGENCY MEDICINE | Admitting: EMERGENCY MEDICINE
Payer: COMMERCIAL

## 2024-10-21 ENCOUNTER — APPOINTMENT (EMERGENCY)
Dept: CT IMAGING | Facility: HOSPITAL | Age: 54
End: 2024-10-21
Payer: COMMERCIAL

## 2024-10-21 ENCOUNTER — TELEPHONE (OUTPATIENT)
Age: 54
End: 2024-10-21

## 2024-10-21 VITALS
RESPIRATION RATE: 18 BRPM | TEMPERATURE: 97.7 F | DIASTOLIC BLOOD PRESSURE: 66 MMHG | BODY MASS INDEX: 29.23 KG/M2 | OXYGEN SATURATION: 99 % | HEIGHT: 63 IN | WEIGHT: 165 LBS | HEART RATE: 71 BPM | SYSTOLIC BLOOD PRESSURE: 123 MMHG

## 2024-10-21 DIAGNOSIS — R06.02 SHORTNESS OF BREATH: ICD-10-CM

## 2024-10-21 DIAGNOSIS — R07.9 CHEST PAIN, UNSPECIFIED TYPE: ICD-10-CM

## 2024-10-21 DIAGNOSIS — R55 SYNCOPE: Primary | ICD-10-CM

## 2024-10-21 LAB
2HR DELTA HS TROPONIN: >1 NG/L
ALBUMIN SERPL BCG-MCNC: 4.2 G/DL (ref 3.5–5)
ALP SERPL-CCNC: 58 U/L (ref 34–104)
ALT SERPL W P-5'-P-CCNC: 11 U/L (ref 7–52)
ANION GAP SERPL CALCULATED.3IONS-SCNC: 6 MMOL/L (ref 4–13)
AST SERPL W P-5'-P-CCNC: 15 U/L (ref 13–39)
BASOPHILS # BLD AUTO: 0.03 THOUSANDS/ΜL (ref 0–0.1)
BASOPHILS NFR BLD AUTO: 0 % (ref 0–1)
BILIRUB SERPL-MCNC: 0.36 MG/DL (ref 0.2–1)
BNP SERPL-MCNC: 30 PG/ML (ref 0–100)
BUN SERPL-MCNC: 16 MG/DL (ref 5–25)
CALCIUM SERPL-MCNC: 9.3 MG/DL (ref 8.4–10.2)
CARDIAC TROPONIN I PNL SERPL HS: 3 NG/L
CARDIAC TROPONIN I PNL SERPL HS: <2 NG/L
CHLORIDE SERPL-SCNC: 103 MMOL/L (ref 96–108)
CO2 SERPL-SCNC: 27 MMOL/L (ref 21–32)
CREAT SERPL-MCNC: 0.97 MG/DL (ref 0.6–1.3)
EOSINOPHIL # BLD AUTO: 0.26 THOUSAND/ΜL (ref 0–0.61)
EOSINOPHIL NFR BLD AUTO: 3 % (ref 0–6)
ERYTHROCYTE [DISTWIDTH] IN BLOOD BY AUTOMATED COUNT: 12.5 % (ref 11.6–15.1)
GFR SERPL CREATININE-BSD FRML MDRD: 67 ML/MIN/1.73SQ M
GLUCOSE SERPL-MCNC: 91 MG/DL (ref 65–140)
HCT VFR BLD AUTO: 38.6 % (ref 34.8–46.1)
HGB BLD-MCNC: 12.6 G/DL (ref 11.5–15.4)
IMM GRANULOCYTES # BLD AUTO: 0.02 THOUSAND/UL (ref 0–0.2)
IMM GRANULOCYTES NFR BLD AUTO: 0 % (ref 0–2)
LYMPHOCYTES # BLD AUTO: 2.36 THOUSANDS/ΜL (ref 0.6–4.47)
LYMPHOCYTES NFR BLD AUTO: 28 % (ref 14–44)
MCH RBC QN AUTO: 28.6 PG (ref 26.8–34.3)
MCHC RBC AUTO-ENTMCNC: 32.6 G/DL (ref 31.4–37.4)
MCV RBC AUTO: 88 FL (ref 82–98)
MONOCYTES # BLD AUTO: 0.48 THOUSAND/ΜL (ref 0.17–1.22)
MONOCYTES NFR BLD AUTO: 6 % (ref 4–12)
NEUTROPHILS # BLD AUTO: 5.2 THOUSANDS/ΜL (ref 1.85–7.62)
NEUTS SEG NFR BLD AUTO: 63 % (ref 43–75)
NRBC BLD AUTO-RTO: 0 /100 WBCS
PLATELET # BLD AUTO: 348 THOUSANDS/UL (ref 149–390)
PMV BLD AUTO: 10.6 FL (ref 8.9–12.7)
POTASSIUM SERPL-SCNC: 4.3 MMOL/L (ref 3.5–5.3)
PROT SERPL-MCNC: 7.2 G/DL (ref 6.4–8.4)
RBC # BLD AUTO: 4.4 MILLION/UL (ref 3.81–5.12)
SODIUM SERPL-SCNC: 136 MMOL/L (ref 135–147)
WBC # BLD AUTO: 8.35 THOUSAND/UL (ref 4.31–10.16)

## 2024-10-21 PROCEDURE — 36415 COLL VENOUS BLD VENIPUNCTURE: CPT | Performed by: EMERGENCY MEDICINE

## 2024-10-21 PROCEDURE — 84484 ASSAY OF TROPONIN QUANT: CPT | Performed by: EMERGENCY MEDICINE

## 2024-10-21 PROCEDURE — 93005 ELECTROCARDIOGRAM TRACING: CPT

## 2024-10-21 PROCEDURE — 74174 CTA ABD&PLVS W/CONTRAST: CPT

## 2024-10-21 PROCEDURE — 99285 EMERGENCY DEPT VISIT HI MDM: CPT

## 2024-10-21 PROCEDURE — 99285 EMERGENCY DEPT VISIT HI MDM: CPT | Performed by: EMERGENCY MEDICINE

## 2024-10-21 PROCEDURE — 71275 CT ANGIOGRAPHY CHEST: CPT

## 2024-10-21 PROCEDURE — 83880 ASSAY OF NATRIURETIC PEPTIDE: CPT | Performed by: EMERGENCY MEDICINE

## 2024-10-21 PROCEDURE — 80053 COMPREHEN METABOLIC PANEL: CPT | Performed by: EMERGENCY MEDICINE

## 2024-10-21 PROCEDURE — 85025 COMPLETE CBC W/AUTO DIFF WBC: CPT | Performed by: EMERGENCY MEDICINE

## 2024-10-21 RX ORDER — ROSUVASTATIN CALCIUM 5 MG/1
5 TABLET, COATED ORAL
Qty: 30 TABLET | Refills: 5 | Status: SHIPPED | OUTPATIENT
Start: 2024-10-21

## 2024-10-21 RX ADMIN — IOHEXOL 100 ML: 350 INJECTION, SOLUTION INTRAVENOUS at 11:31

## 2024-10-21 NOTE — TELEPHONE ENCOUNTER
Patient called regarding Zio monitor ordered by Dr Ponce on 10/16 at the office visit.    Patient has not received the monitor in the mail.

## 2024-10-21 NOTE — Clinical Note
Kristal Corrigan was seen and treated in our emergency department on 10/21/2024.            Light duty until cleared by primary care physician or cardiologist    Diagnosis:     Kristal  may return to work on return date.    She may return on this date: 10/23/2024         If you have any questions or concerns, please don't hesitate to call.      Vandana Wagner MD    ______________________________           _______________          _______________  Hospital Representative                              Date                                Time

## 2024-10-21 NOTE — DISCHARGE INSTRUCTIONS
Follow-up with your primary care physician and cardiologist.  Please return to the emergency department if you develop worsening symptoms, chest pain, difficulty breathing, loss of consciousness, or anything else concerning to you.

## 2024-10-21 NOTE — TELEPHONE ENCOUNTER
Called pt to let her know that Zio is still in transit but after clicking on track package link it said it was already delivered. Gave pt [employer] number to call in order to figure out issue.

## 2024-10-21 NOTE — ED PROVIDER NOTES
"Time reflects when diagnosis was documented in both MDM as applicable and the Disposition within this note       Time User Action Codes Description Comment    10/21/2024 12:55 PM Vandana Wagner [R55] Syncope     10/21/2024 12:55 PM Vandana Wagner [R07.9] Chest pain, unspecified type     10/21/2024 12:56 PM Vandana Wagner [R06.02] Shortness of breath           ED Disposition       ED Disposition   AMA    Condition   --    Date/Time   Mon Oct 21, 2024 12:55 PM    Comment   Date: 10/21/2024  Patient: Kristal Corrigan  Admitted: 10/21/2024 10:36 AM  Attending Provider: Vandana Wagner MD    Kristal Corrigan or her authorized caregiver has made the decision for the patient to leave the emergency department against the advice  of her attending physician. She or her authorized caregiver has been informed and understands the inherent risks, including death, coma, permanent disability, arrhythmia, cardiac arrest.  She or her authorized caregiver has decided to accept the res ponsibility for this decision. Kristal Corrigan and all necessary parties have been advised that she may return for further evaluation or treatment. Her condition at time of discharge was stable.  Kristal Corrigan had current vital signs as follows:  BP  138/66 (BP Location: Left arm)   Pulse 73   Temp 97.7 °F (36.5 °C) (Oral)   Resp 20   Ht 5' 3\" (1.6 m)   Wt 74.8 kg (165 lb)   LMP 09/30/2024 (Approximate)                Assessment & Plan       Medical Decision Making  54-year-old female presenting for evaluation of chest pain, shortness of breath, syncope.  Vital signs stable on arrival.  Differential diagnoses include but not limited to PE, aortic dissection, ACS, pneumothorax, pneumonia, arrhythmia, electrolyte abnormality, dehydration, vasovagal syncope.  Labs overall unremarkable.  CTA dissection study ordered and without acute pathology.  No thoracic aneurysm seen.  Recommended admission due to setting of syncope and chest pain.  This " was discussed with patient, however, she does not wish to stay in the hospital at this time.  Risks of leaving the hospital at this time were discussed, she understands, and would like to be discharged.  Patient was signed out AMA.  Referred to cardiology for follow-up.  Return precautions discussed.    Problems Addressed:  Chest pain, unspecified type: acute illness or injury  Shortness of breath: acute illness or injury  Syncope: acute illness or injury    Amount and/or Complexity of Data Reviewed  Labs: ordered. Decision-making details documented in ED Course.  Radiology: ordered.  ECG/medicine tests: ordered and independent interpretation performed. Decision-making details documented in ED Course.    Risk  Prescription drug management.        ED Course as of 10/21/24 1636   Mon Oct 21, 2024   1055 Procedure Note: EKG  Date/Time: 10/21/24 10:42 AM   Interpreted by: Vandana Wagner  Indications / Diagnosis: CP  ECG reviewed by me, the ED Provider: yes   The EKG demonstrates:  Rhythm: rate 77, normal sinus  Intervals: normal intervals  Axis: normal axis  QRS/Blocks: normal QRS  ST Changes: No acute ST Changes, no STD/JANKI.    1104 CBC and differential   1127 Comprehensive metabolic panel   1129 BNP: 30   1129 hs TnI 0hr: <2       Medications   iohexol (OMNIPAQUE) 350 MG/ML injection (SINGLE-DOSE) 100 mL (100 mL Intravenous Given 10/21/24 1131)       ED Risk Strat Scores   HEART Risk Score      Flowsheet Row Most Recent Value   Heart Score Risk Calculator    History 2 Filed at: 10/21/2024 1255   ECG 0 Filed at: 10/21/2024 1255   Age 1 Filed at: 10/21/2024 1255   Risk Factors 1 Filed at: 10/21/2024 1255   Troponin 0 Filed at: 10/21/2024 1255   HEART Score 4 Filed at: 10/21/2024 1255                               SBIRT 22yo+      Flowsheet Row Most Recent Value   Initial Alcohol Screen: US AUDIT-C     1. How often do you have a drink containing alcohol? 0 Filed at: 10/21/2024 1039   2. How many drinks containing alcohol  do you have on a typical day you are drinking?  0 Filed at: 10/21/2024 1039   3a. Male UNDER 65: How often do you have five or more drinks on one occasion? 0 Filed at: 10/21/2024 1039   3b. FEMALE Any Age, or MALE 65+: How often do you have 4 or more drinks on one occassion? 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        Past Medical History:   Diagnosis Date    Anemia     Aortic aneurysm (HCC)     Basal cell carcinoma     Cancer (HCC)     basal cell skin cancer    Eosinophilic esophagitis     Gastritis     GERD (gastroesophageal reflux disease)     History of breast problem       Past Surgical History:   Procedure Laterality Date    BASAL CELL CARCINOMA EXCISION  2018    Dr Bloch    BASAL CELL CARCINOMA EXCISION Right     Right ear    BREAST BIOPSY      X3    BREAST SURGERY      BREAST SURGERY Right     Right breast lump removal    COLONOSCOPY      MAMMO (HISTORICAL)  2017    Mammo/U/S 11/2017 stable; next mammo 1 year    REVISION OF SCAR  09/2019    Excision of Painful scar Right retroauricular area - Dr. Bloch    UMBILICAL HERNIA REPAIR        Family History   Problem Relation Age of Onset    Heart disease Mother     Stroke Mother     Lung cancer Father       Social History     Tobacco Use    Smoking status: Never    Smokeless tobacco: Never   Vaping Use    Vaping status: Never Used   Substance Use Topics    Alcohol use: Not Currently    Drug use: Never      E-Cigarette/Vaping    E-Cigarette Use Never User       E-Cigarette/Vaping Substances    THC No     CBD No       I have reviewed and agree with the history as documented.     54-year-old female with history of a thoracic aortic aneurysm, A-fib presenting for  evaluation of chest pain, shortness of breath, syncope.  Patient reports that she was feeling in her usual state of health while at work.  She then developed onset of shortness of breath followed by chest pain.  She then became lightheaded and had a syncopal episode.  She does not believe that she struck her head.  She continues with some shortness of breath and chest pain at this time although improved from prior.  She is not feeling lightheaded anymore.  She states that this has happened in the past but not for several months.  No nausea, diaphoresis, abdominal pain.        Review of Systems   Constitutional:  Negative for chills and fever.   Respiratory:  Positive for shortness of breath. Negative for cough.    Cardiovascular:  Positive for chest pain. Negative for leg swelling.   Gastrointestinal:  Negative for abdominal pain, diarrhea, nausea and vomiting.   Genitourinary:  Negative for flank pain.   Musculoskeletal:  Negative for gait problem.   Skin:  Negative for rash.   Neurological:  Positive for light-headedness. Negative for weakness.   All other systems reviewed and are negative.          Objective       ED Triage Vitals   Temperature Pulse Blood Pressure Respirations SpO2 Patient Position - Orthostatic VS   10/21/24 1041 10/21/24 1039 10/21/24 1039 10/21/24 1039 10/21/24 1039 10/21/24 1039   97.7 °F (36.5 °C) 87 133/84 22 97 % Lying      Temp Source Heart Rate Source BP Location FiO2 (%) Pain Score    10/21/24 1041 10/21/24 1039 10/21/24 1039 -- 10/21/24 1303    Oral Monitor Right arm  No Pain      Vitals      Date and Time Temp Pulse SpO2 Resp BP Pain Score FACES Pain Rating User   10/21/24 1303 -- 71 99 % 18 123/66 No Pain -- KLB   10/21/24 1215 -- 73 100 % 20 138/66 -- -- MS   10/21/24 1041 97.7 °F (36.5 °C) -- -- -- -- -- -- ALG   10/21/24 1039 -- 87 97 % 22 133/84 -- -- ALG            Physical Exam  Vitals and nursing note reviewed.   Constitutional:       General: She is not in acute distress.      Appearance: She is well-developed. She is not ill-appearing.   HENT:      Head: Normocephalic and atraumatic.      Nose: Nose normal.      Mouth/Throat:      Mouth: Mucous membranes are moist.   Eyes:      Conjunctiva/sclera: Conjunctivae normal.   Cardiovascular:      Rate and Rhythm: Normal rate and regular rhythm.      Heart sounds: No murmur heard.     No friction rub. No gallop.   Pulmonary:      Effort: Pulmonary effort is normal.      Breath sounds: Normal breath sounds. No wheezing, rhonchi or rales.   Abdominal:      General: There is no distension.      Palpations: Abdomen is soft.      Tenderness: There is no abdominal tenderness.   Musculoskeletal:         General: No swelling or tenderness. Normal range of motion.      Cervical back: Normal range of motion and neck supple.   Skin:     General: Skin is warm and dry.      Coloration: Skin is not pale.      Findings: No rash.   Neurological:      General: No focal deficit present.      Mental Status: She is alert and oriented to person, place, and time.   Psychiatric:         Behavior: Behavior normal.         Results Reviewed       Procedure Component Value Units Date/Time    HS Troponin I 2hr [[id number]]  (Normal) Collected: 10/21/24 1231    Lab Status: Final result Specimen: Blood from Arm, Right Updated: 10/21/24 1301     hs TnI 2hr 3 ng/L      Delta 2hr hsTnI >1 ng/L     HS Troponin 0hr (reflex protocol) [[id number]]  (Normal) Collected: 10/21/24 1056    Lab Status: Final result Specimen: Blood from Arm, Right Updated: 10/21/24 1129     hs TnI 0hr <2 ng/L     B-Type Natriuretic Peptide(BNP) [[id number]]  (Normal) Collected: 10/21/24 1056    Lab Status: Final result Specimen: Blood from Arm, Right Updated: 10/21/24 1128     BNP 30 pg/mL     Comprehensive metabolic panel [[id number]] Collected: 10/21/24 1056    Lab Status: Final result Specimen: Blood from Arm, Right Updated: 10/21/24 1123     Sodium 136 mmol/L      Potassium 4.3 mmol/L      Chloride 103  mmol/L      CO2 27 mmol/L      ANION GAP 6 mmol/L      BUN 16 mg/dL      Creatinine 0.97 mg/dL      Glucose 91 mg/dL      Calcium 9.3 mg/dL      AST 15 U/L      ALT 11 U/L      Alkaline Phosphatase 58 U/L      Total Protein 7.2 g/dL      Albumin 4.2 g/dL      Total Bilirubin 0.36 mg/dL      eGFR 67 ml/min/1.73sq m     Narrative:      National Kid[employer] Foundation guidelines for Chronic Kidney Disease (CKD):     Stage 1 with normal or high GFR (GFR > 90 mL/min/1.73 square meters)    Stage 2 Mild CKD (GFR = 60-89 mL/min/1.73 square meters)    Stage 3A Moderate CKD (GFR = 45-59 mL/min/1.73 square meters)    Stage 3B Moderate CKD (GFR = 30-44 mL/min/1.73 square meters)    Stage 4 Severe CKD (GFR = 15-29 mL/min/1.73 square meters)    Stage 5 End Stage CKD (GFR <15 mL/min/1.73 square meters)  Note: GFR calculation is accurate only with a steady state creatinine    CBC and differential [[id number]] Collected: 10/21/24 1056    Lab Status: Final result Specimen: Blood from Arm, Right Updated: 10/21/24 1104     WBC 8.35 Thousand/uL      RBC 4.40 Million/uL      Hemoglobin 12.6 g/dL      Hematocrit 38.6 %      MCV 88 fL      MCH 28.6 pg      MCHC 32.6 g/dL      RDW 12.5 %      MPV 10.6 fL      Platelets 348 Thousands/uL      nRBC 0 /100 WBCs      Segmented % 63 %      Immature Grans % 0 %      Lymphocytes % 28 %      Monocytes % 6 %      Eosinophils Relative 3 %      Basophils Relative 0 %      Absolute Neutrophils 5.20 Thousands/µL      Absolute Immature Grans 0.02 Thousand/uL      Absolute Lymphocytes 2.36 Thousands/µL      Absolute Monocytes 0.48 Thousand/µL      Eosinophils Absolute 0.26 Thousand/µL      Basophils Absolute 0.03 Thousands/µL             CTA dissection protocol chest abdomen pelvis w wo contrast   Final Interpretation by Derek Reed MD (10/21 1211)      No acute findings in the chest, abdomen or pelvis.               Workstation performed: [id number]             Procedures    ED Medication and  Procedure Management   Prior to Admission Medications   Prescriptions Last Dose Informant Patient Reported? Taking?   Azelastine HCl 137 MCG/SPRAY SOLN   Yes No   Sig: SPRAY 1-2 SPRAYS INTO EACH NOSTRIL TWICE A DAY AS DIRECTED   Patient not taking: Reported on 10/16/2024   Multiple Vitamin (multivitamin) tablet   Yes No   Sig: Take 1 tablet by mouth daily   acetaminophen (TYLENOL) 325 mg tablet   No No   Sig: Take 2 tablets (650 mg total) by mouth every 4 (four) hours as needed for mild pain   apixaban (Eliquis) 5 mg   No No   Sig: Take 1 tablet (5 mg total) by mouth 2 (two) times a day   ferrous sulfate 325 (65 Fe) mg tablet   Yes No   Sig: Take 1 tablet by mouth daily with breakfast   fluticasone (FLONASE) 50 mcg/act nasal spray   Yes No   Si sprays daily   loratadine (CLARITIN) 10 mg tablet   Yes No   Sig: Take 10 mg by mouth daily   Patient not taking: Reported on 10/16/2024   metoprolol tartrate (LOPRESSOR) 25 mg tablet   No No   Sig: Take 1 tablet (25 mg total) by mouth every 12 (twelve) hours   rosuvastatin (CRESTOR) 5 mg tablet   No No   Sig: Take 1 tablet (5 mg total) by mouth daily at bedtime      Facility-Administered Medications: None     Discharge Medication List as of 10/21/2024 12:57 PM        CONTINUE these medications which have NOT CHANGED    Details   acetaminophen (TYLENOL) 325 mg tablet Take 2 tablets (650 mg total) by mouth every 4 (four) hours as needed for mild pain, Starting Sat 2022, No Print      apixaban (Eliquis) 5 mg Take 1 tablet (5 mg total) by mouth 2 (two) times a day, Starting Thu 2023, Normal      Azelastine HCl 137 MCG/SPRAY SOLN SPRAY 1-2 SPRAYS INTO EACH NOSTRIL TWICE A DAY AS DIRECTED, Historical Med      ferrous sulfate 325 (65 Fe) mg tablet Take 1 tablet by mouth daily with breakfast, Starting Tu2023, Until Wed 10/16/2024, Historical Med      fluticasone (FLONASE) 50 mcg/act nasal spray 2 sprays daily, Starting Mon 2024, Historical Med       loratadine (CLARITIN) 10 mg tablet Take 10 mg by mouth daily, Starting Wed 11/22/2023, Until Thu 11/21/2024, Historical Med      metoprolol tartrate (LOPRESSOR) 25 mg tablet Take 1 tablet (25 mg total) by mouth every 12 (twelve) hours, Starting Fri 11/17/2023, Until Wed 10/16/2024, Normal      Multiple Vitamin (multivitamin) tablet Take 1 tablet by mouth daily, Historical Med      rosuvastatin (CRESTOR) 5 mg tablet Take 1 tablet (5 mg total) by mouth daily at bedtime, Starting Mon 10/21/2024, Normal             ED SEPSIS DOCUMENTATION   Time reflects when diagnosis was documented in both MDM as applicable and the Disposition within this note       Time User Action Codes Description Comment    10/21/2024 12:55 PM Vandana Wagner [R55] Syncope     10/21/2024 12:55 PM Vandana Wagner [R07.9] Chest pain, unspecified type     10/21/2024 12:56 PM Vandana Wagner [R06.02] Shortness of breath                  Vandana Wagner MD  10/21/24 7064

## 2024-10-22 NOTE — RESULT ENCOUNTER NOTE
Called pt with lipid panel results. Pt understood and reports she started on the rosuvastatin yesterday. No questions at this time.

## 2024-10-24 ENCOUNTER — TELEPHONE (OUTPATIENT)
Dept: CARDIOLOGY CLINIC | Facility: CLINIC | Age: 54
End: 2024-10-24

## 2024-10-25 LAB
ATRIAL RATE: 77 BPM
P AXIS: 31 DEGREES
PR INTERVAL: 142 MS
QRS AXIS: 35 DEGREES
QRSD INTERVAL: 74 MS
QT INTERVAL: 356 MS
QTC INTERVAL: 402 MS
T WAVE AXIS: 7 DEGREES
VENTRICULAR RATE: 77 BPM

## 2024-10-25 PROCEDURE — 93010 ELECTROCARDIOGRAM REPORT: CPT | Performed by: INTERNAL MEDICINE

## 2024-10-31 ENCOUNTER — HOSPITAL ENCOUNTER (OUTPATIENT)
Dept: PULMONOLOGY | Facility: HOSPITAL | Age: 54
End: 2024-10-31
Attending: INTERNAL MEDICINE
Payer: COMMERCIAL

## 2024-10-31 DIAGNOSIS — R06.02 SHORTNESS OF BREATH: ICD-10-CM

## 2024-10-31 DIAGNOSIS — D50.9 IRON DEFICIENCY ANEMIA, UNSPECIFIED IRON DEFICIENCY ANEMIA TYPE: ICD-10-CM

## 2024-10-31 PROCEDURE — 94618 PULMONARY STRESS TESTING: CPT

## 2024-10-31 PROCEDURE — 94729 DIFFUSING CAPACITY: CPT | Performed by: INTERNAL MEDICINE

## 2024-10-31 PROCEDURE — 94060 EVALUATION OF WHEEZING: CPT | Performed by: INTERNAL MEDICINE

## 2024-10-31 PROCEDURE — 94726 PLETHYSMOGRAPHY LUNG VOLUMES: CPT | Performed by: INTERNAL MEDICINE

## 2024-10-31 PROCEDURE — 94726 PLETHYSMOGRAPHY LUNG VOLUMES: CPT

## 2024-10-31 PROCEDURE — 94060 EVALUATION OF WHEEZING: CPT

## 2024-10-31 PROCEDURE — 94618 PULMONARY STRESS TESTING: CPT | Performed by: INTERNAL MEDICINE

## 2024-10-31 PROCEDURE — 94729 DIFFUSING CAPACITY: CPT

## 2024-10-31 RX ORDER — ALBUTEROL SULFATE 0.83 MG/ML
2.5 SOLUTION RESPIRATORY (INHALATION) ONCE
Status: COMPLETED | OUTPATIENT
Start: 2024-10-31 | End: 2024-10-31

## 2024-10-31 RX ADMIN — ALBUTEROL SULFATE 2.5 MG: 2.5 SOLUTION RESPIRATORY (INHALATION) at 16:11

## 2024-11-04 DIAGNOSIS — I48.91 ATRIAL FIBRILLATION WITH RAPID VENTRICULAR RESPONSE (HCC): ICD-10-CM

## 2024-11-04 NOTE — TELEPHONE ENCOUNTER
Medication: metoprolol tartrate 25mg tablet    Dose/Frequency: 1 tablet twice a day    Quantity: 60 tablets     Pharmacy: [facility]    Office:   [] PCP/Provider -   [x] Speciality/Provider -     Does the patient have enough for 3 days?   [x] Yes   [] No - Send as HP to POD

## 2024-11-05 RX ORDER — METOPROLOL TARTRATE 25 MG/1
25 TABLET, FILM COATED ORAL EVERY 12 HOURS SCHEDULED
Qty: 180 TABLET | Refills: 1 | Status: SHIPPED | OUTPATIENT
Start: 2024-11-05

## 2024-11-11 ENCOUNTER — HOSPITAL ENCOUNTER (OUTPATIENT)
Dept: NON INVASIVE DIAGNOSTICS | Facility: HOSPITAL | Age: 54
Discharge: HOME | End: 2024-11-11
Attending: INTERNAL MEDICINE
Payer: COMMERCIAL

## 2024-11-11 VITALS
WEIGHT: 165 LBS | SYSTOLIC BLOOD PRESSURE: 123 MMHG | HEIGHT: 63 IN | HEART RATE: 85 BPM | DIASTOLIC BLOOD PRESSURE: 66 MMHG | BODY MASS INDEX: 29.23 KG/M2

## 2024-11-11 DIAGNOSIS — I48.0 PAROXYSMAL ATRIAL FIBRILLATION (HCC): ICD-10-CM

## 2024-11-11 DIAGNOSIS — R06.02 SHORTNESS OF BREATH: ICD-10-CM

## 2024-11-11 LAB
AORTIC ROOT: 3.1 CM
APICAL FOUR CHAMBER EJECTION FRACTION: 70 %
ASCENDING AORTA: 3.4 CM
BSA FOR ECHO PROCEDURE: 1.78 M2
E WAVE DECELERATION TIME: 199 MS
E/A RATIO: 1.11
FRACTIONAL SHORTENING: 33 (ref 28–44)
INTERVENTRICULAR SEPTUM IN DIASTOLE (PARASTERNAL SHORT AXIS VIEW): 0.9 CM
INTERVENTRICULAR SEPTUM: 0.9 CM (ref 0.6–1.1)
LAAS-AP2: 22.5 CM2
LAAS-AP4: 17.1 CM2
LEFT ATRIUM SIZE: 3.4 CM
LEFT ATRIUM VOLUME (MOD BIPLANE): 58 ML
LEFT ATRIUM VOLUME INDEX (MOD BIPLANE): 32.6 ML/M2
LEFT INTERNAL DIMENSION IN SYSTOLE: 2.8 CM (ref 2.1–4)
LEFT VENTRICULAR INTERNAL DIMENSION IN DIASTOLE: 4.2 CM (ref 3.5–6)
LEFT VENTRICULAR POSTERIOR WALL IN END DIASTOLE: 0.9 CM
LEFT VENTRICULAR STROKE VOLUME: 51 ML
LVSV (TEICH): 51 ML
MV E'TISSUE VEL-SEP: 11 CM/S
MV PEAK A VEL: 0.75 M/S
MV PEAK E VEL: 83 CM/S
MV STENOSIS PRESSURE HALF TIME: 58 MS
MV VALVE AREA P 1/2 METHOD: 3.79
RA PRESSURE ESTIMATED: 4 MMHG
RIGHT ATRIUM AREA SYSTOLE A4C: 12.6 CM2
RIGHT VENTRICLE ID DIMENSION: 3.1 CM
RV PSP: 19 MMHG
SL CV LEFT ATRIUM LENGTH A2C: 5.4 CM
SL CV LV EF: 55
SL CV PED ECHO LEFT VENTRICLE DIASTOLIC VOLUME (MOD BIPLANE) 2D: 80 ML
SL CV PED ECHO LEFT VENTRICLE SYSTOLIC VOLUME (MOD BIPLANE) 2D: 29 ML
TR MAX PG: 15 MMHG
TR PEAK VELOCITY: 2 M/S
TRICUSPID ANNULAR PLANE SYSTOLIC EXCURSION: 1.8 CM
TRICUSPID VALVE PEAK REGURGITATION VELOCITY: 1.95 M/S

## 2024-11-11 PROCEDURE — 93306 TTE W/DOPPLER COMPLETE: CPT | Performed by: INTERNAL MEDICINE

## 2024-11-11 PROCEDURE — 93306 TTE W/DOPPLER COMPLETE: CPT

## 2024-11-12 ENCOUNTER — TELEPHONE (OUTPATIENT)
Dept: CARDIOLOGY CLINIC | Facility: CLINIC | Age: 54
End: 2024-11-12

## 2024-11-12 DIAGNOSIS — I48.0 PAROXYSMAL ATRIAL FIBRILLATION (HCC): ICD-10-CM

## 2024-11-12 DIAGNOSIS — R00.2 PALPITATIONS: ICD-10-CM

## 2024-11-12 NOTE — TELEPHONE ENCOUNTER
----- Message from Rodolfo Ponce MD sent at 11/12/2024  3:05 PM EST -----  Your heart monitor showed another episode of atrial fibrillation lasting for about 14 hours on the night of 27th October (Sunday) from 11:21 PM.  Your heart rate was fast during this episode, and you did not indicate symptoms during this.  I recommend you increase your metoprolol dose to 50 mg twice a day to help suppress this further.  You should continue on Eliquis to protect yourself from a stroke related to this A-fib.  If you continue to have more episodes of palpitations especially prolonged, then let me know and we may need to consider additional medical therapy to help with this.  We will discuss further at your upcoming appointment on the 3rd December.  But let me know if you have any other urgent concerns before that.

## 2024-11-12 NOTE — RESULT ENCOUNTER NOTE
Called pt and gave her heart monitor results and your recommendation to start on metoprolol 50 mg twice a day. Explained to patient that it would mean taking two 25mg pills twice a day for a total of 4 pills a day of metoprolol. Pt understood. Reminded pt of December 3rd appointment with you and encouraged her to let us know of any other urgent concerns before then. Pt understood. No questions at this time.

## 2024-11-12 NOTE — TELEPHONE ENCOUNTER
Jozef Jackson is a 4 week old female presenting for   Chief Complaint   Patient presents with   • Well Infant Birth to 23 Months     1 month      Denies Latex allergy or sensitivity.    Medication verified and med list updated  Refills needed today: No    Health Maintenance Due   Topic Date Due   • Hepatitis B Vaccine (1 of 3 - 3-dose primary series) Never done   • Well Child Exam by 1 Month  2021       Patient is due for topics as listed above but is not proceeding with Immunization(s) Hep B at this time.          Last lab results:   No results found for: HGBA1C  No results found for: CHOLESTEROL  No results found for: HDL  No results found for: TRIGLYCERIDE  No results found for: CALCLDL  No results found for: URMIC, UCR, MALBCR  No results found for: IFOB              Depression Screening:       Advance Directives:  not discussed     Left message for patient

## 2024-11-14 ENCOUNTER — RESULTS FOLLOW-UP (OUTPATIENT)
Dept: CARDIOLOGY CLINIC | Facility: CLINIC | Age: 54
End: 2024-11-14

## 2024-11-14 NOTE — RESULT ENCOUNTER NOTE
Left message for pt about taking 25mg BID of metoprolol instead. Left office call back number or suggested Orbsterhart message if pt has any questions.

## 2024-11-22 ENCOUNTER — NURSE TRIAGE (OUTPATIENT)
Age: 54
End: 2024-11-22

## 2024-11-22 ENCOUNTER — OFFICE VISIT (OUTPATIENT)
Dept: OBGYN CLINIC | Facility: CLINIC | Age: 54
End: 2024-11-22
Payer: COMMERCIAL

## 2024-11-22 VITALS — SYSTOLIC BLOOD PRESSURE: 146 MMHG | DIASTOLIC BLOOD PRESSURE: 78 MMHG | HEIGHT: 63 IN | BODY MASS INDEX: 29.23 KG/M2

## 2024-11-22 DIAGNOSIS — R92.30 DENSE BREAST: ICD-10-CM

## 2024-11-22 DIAGNOSIS — N63.20 MASS OF LEFT BREAST, UNSPECIFIED QUADRANT: Primary | ICD-10-CM

## 2024-11-22 PROCEDURE — 99213 OFFICE O/P EST LOW 20 MIN: CPT | Performed by: PHYSICIAN ASSISTANT

## 2024-11-22 RX ORDER — AZELASTINE 1 MG/ML
1-2 SPRAY, METERED NASAL 2 TIMES DAILY
COMMUNITY
Start: 2024-08-03

## 2024-11-22 NOTE — TELEPHONE ENCOUNTER
"Pt called in reporting a nickel sized lump above the nipple on her left breast since this past weekend. Starting to cause mild pain. Denies redness/discoloration, swelling, nipple drainage, fever. Pt states she had calcifications in her right breast a few years ago and had surgery to remove them. Pt has not had her period since September but denies chance of pregnancy. Pt given appointment for this afternoon and is thankful.     Reason for Disposition   Breast lump    Answer Assessment - Initial Assessment Questions  1. SYMPTOM: \"What's the main symptom you're concerned about?\"  (e.g., lump, nipple discharge, pain, rash )      Lump on left breast  2. LOCATION: \"Where is the lump located?\"      Left breast  3. ONSET: \"When did it  start?\"      Noticed over the weekend  4. PRIOR HISTORY: \"Do you have any history of prior problems with your breasts?\" (e.g., breast cancer, breast implant, fibrocystic breast disease)      Right breast had calcium deposits, had surgery a couple of years ago  5. CAUSE: \"What do you think is causing this symptom?\"      unsure  6. OTHER SYMPTOMS: \"Do you have any other symptoms?\" (e.g., fever, breast pain, nipple discharge, redness or rash)      Mild pain  7. PREGNANCY-BREASTFEEDING: \"Is there any chance you are pregnant?\" \"When was your last menstrual period?\" \"Are you breastfeeding?\"      Period in September, denies chance of pregnancy    Protocols used: Breast Symptoms-Adult-[place]    "

## 2024-11-22 NOTE — PROGRESS NOTES
Assessment/Plan:      Diagnoses and all orders for this visit:    Mass of left breast, unspecified quadrant  -     Mammo diagnostic bilateral w 3d and cad; Future  -     US breast left limited (diagnostic); Future    Dense breast  -     Mammo diagnostic bilateral w 3d and cad; Future  -     US breast left limited (diagnostic); Future    Other orders  -     azelastine (ASTELIN) 0.1 % nasal spray; 1-2 sprays into each nostril 2 (two) times a day     54-year-old female accompanied by her partner today for concern of a left breast mass noted 1 week ago by her partner.  She denies any breast symptoms of nipple discharge, skin distortion or rash or breast pain.  She does unfortunately have asymmetry being followed at [facility] with breast biopsy in June that was benign.  Dense breast tissue.  Also states she has history of lumpectomy benign in the right breast as well.    On examination today there is a asymmetric palpable nontender mass at 12:00 just superior to the areola without any visible skin distortion or rash.  Would recommend diagnostic imaging to evaluate further.  Since she reports being due for her 6-month diagnostic follow-up of the right breast we will order diagnostic mammogram bilateral 3D as well as left breast ultrasound to further evaluate the area of concern.  She will connect with [facility] to set this up in the next few weeks and we will follow-up with patient thereafter.    Chief Complaint   Patient presents with    Breast Mass     Left breast lump the size of a adriel about a week ago.          Subjective:     Patient ID: Kristal Corrigan is a 54 y.o. female.    53y/o female here today for left breast lump. States her partner found it a week ago.  No pain in the left breast. Denies nipple discharge or skin change.   Recent history of asymmetry right breast with biopsy through [facility] in June benign.  She states she is due for diagnostic mammogram of the right breast next  month.        Review of Systems   Constitutional: Negative.    Skin:         Left breast lump, no rash or skin distortion.   Hematological:  Negative for adenopathy.         The following portions of the patient's history were reviewed and updated as appropriate: allergies, current medications, past family history, past medical history, past social history, past surgical history, and problem list.      Objective:     Physical Exam  Vitals reviewed.   Constitutional:       Appearance: Normal appearance. She is not ill-appearing.   Chest:   Breasts:     Right: Mass (Cluster of tissue approximately 10:00 which is exact area of her benign biopsy in June) present. No swelling, bleeding, inverted nipple, nipple discharge, skin change or tenderness.      Left: Mass (At 12:00 just superior to areola) present. No swelling, bleeding, inverted nipple, nipple discharge, skin change or tenderness.       Lymphadenopathy:      Upper Body:      Right upper body: No supraclavicular, axillary or pectoral adenopathy.      Left upper body: No supraclavicular, axillary or pectoral adenopathy.   Neurological:      Mental Status: She is alert.

## 2024-12-03 ENCOUNTER — OFFICE VISIT (OUTPATIENT)
Dept: CARDIOLOGY CLINIC | Facility: CLINIC | Age: 54
End: 2024-12-03
Payer: COMMERCIAL

## 2024-12-03 VITALS
DIASTOLIC BLOOD PRESSURE: 70 MMHG | BODY MASS INDEX: 30.48 KG/M2 | WEIGHT: 172 LBS | SYSTOLIC BLOOD PRESSURE: 100 MMHG | HEIGHT: 63 IN | TEMPERATURE: 96.9 F | OXYGEN SATURATION: 99 % | HEART RATE: 65 BPM

## 2024-12-03 DIAGNOSIS — E66.811 CLASS 1 OBESITY DUE TO EXCESS CALORIES WITH SERIOUS COMORBIDITY AND BODY MASS INDEX (BMI) OF 30.0 TO 30.9 IN ADULT: ICD-10-CM

## 2024-12-03 DIAGNOSIS — R55 SYNCOPE: ICD-10-CM

## 2024-12-03 DIAGNOSIS — R07.9 CHEST PAIN, UNSPECIFIED TYPE: ICD-10-CM

## 2024-12-03 DIAGNOSIS — I48.0 PAROXYSMAL ATRIAL FIBRILLATION (HCC): Primary | ICD-10-CM

## 2024-12-03 DIAGNOSIS — E66.09 CLASS 1 OBESITY DUE TO EXCESS CALORIES WITH SERIOUS COMORBIDITY AND BODY MASS INDEX (BMI) OF 30.0 TO 30.9 IN ADULT: ICD-10-CM

## 2024-12-03 DIAGNOSIS — R06.02 SHORTNESS OF BREATH: ICD-10-CM

## 2024-12-03 PROCEDURE — 99214 OFFICE O/P EST MOD 30 MIN: CPT | Performed by: INTERNAL MEDICINE

## 2024-12-03 PROCEDURE — 93000 ELECTROCARDIOGRAM COMPLETE: CPT | Performed by: INTERNAL MEDICINE

## 2024-12-03 RX ORDER — METOPROLOL SUCCINATE 25 MG/1
25 TABLET, EXTENDED RELEASE ORAL DAILY
Qty: 30 TABLET | Refills: 2 | Status: SHIPPED | OUTPATIENT
Start: 2024-12-03
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
## 2024-12-03 NOTE — LETTER
December 3, 2024     Patient: Kristal Corrigan  YOB: 1970  Date of Visit: 12/3/2024      To Whom it May Concern:    Kristal Corrigan is under my professional care. Kristal was seen in my office on 12/3/2024. Kristal may continue to work. She has no restrictions from the cardiac perspective.     If you have any questions or concerns, please don't hesitate to call.         Sincerely,          Rodolfo Ponce MD        CC: No Recipients

## 2024-12-08 NOTE — ASSESSMENT & PLAN NOTE
Overview  10/21/24 ED visit with syncope  Reportedly was in her usual state of health at work when she suddenly developed shortness of breath/chest pain followed by lightheadedness dizziness send in brief syncope  Labs, CT dissection protocol was negative and she was recommended admission for monitoring, but she left AMA  12/2024: No further issues with dizziness/syncope  Impression  May have been related to baseline low blood pressure with A-fib causing further drop in pressure  Plan  Metoprolol dosing being decreased and she is being switched to metoprolol succinate 25 mg daily to help with this  She will benefit from rhythm control for A-fib

## 2024-12-20 ENCOUNTER — HOSPITAL ENCOUNTER (OUTPATIENT)
Dept: NON INVASIVE DIAGNOSTICS | Facility: CLINIC | Age: 54
Discharge: HOME | End: 2024-12-20
Payer: COMMERCIAL

## 2024-12-20 DIAGNOSIS — R06.02 SHORTNESS OF BREATH: ICD-10-CM

## 2024-12-20 DIAGNOSIS — I48.0 PAROXYSMAL ATRIAL FIBRILLATION (HCC): ICD-10-CM

## 2024-12-20 DIAGNOSIS — R07.9 CHEST PAIN, UNSPECIFIED TYPE: ICD-10-CM

## 2024-12-20 LAB
CHEST PAIN STATEMENT: NORMAL
MAX DIASTOLIC BP: 92 MMHG
MAX HR PERCENT: 92 %
MAX HR: 155 BPM
MAX PREDICTED HEART RATE: 168 BPM
NUC STRESS EJECTION FRACTION: 60 %
PROTOCOL NAME: NORMAL
RATE PRESSURE PRODUCT: NORMAL
REASON FOR TERMINATION: NORMAL
SL CV REST NUCLEAR ISOTOPE DOSE: 10.7 MCI
SL CV STRESS NUCLEAR ISOTOPE DOSE: 32.1 MCI
SL CV STRESS RECOVERY BP: NORMAL MMHG
SL CV STRESS RECOVERY HR: 104 BPM
SL CV STRESS RECOVERY O2 SAT: 99 %
SL CV STRESS STAGE REACHED: 1
STRESS ANGINA INDEX: 2
STRESS BASELINE BP: NORMAL MMHG
STRESS BASELINE HR: 66 BPM
STRESS O2 SAT REST: 99 %
STRESS PEAK HR: 125 BPM
STRESS POST ESTIMATED WORKLOAD: 4.6 METS
STRESS POST EXERCISE DUR MIN: 2 MIN
STRESS POST EXERCISE DUR MIN: 2 MIN
STRESS POST EXERCISE DUR SEC: 50 SEC
STRESS POST EXERCISE DUR SEC: 50 SEC
STRESS POST O2 SAT PEAK: 99 %
STRESS POST PEAK BP: 132 MMHG
STRESS POST PEAK HR: 155 BPM
STRESS POST PEAK SYSTOLIC BP: 170 MMHG
STRESS/REST PERFUSION RATIO: 0.96
TARGET HR FORMULA: NORMAL
TEST INDICATION: NORMAL

## 2024-12-20 PROCEDURE — 93016 CV STRESS TEST SUPVJ ONLY: CPT | Performed by: INTERNAL MEDICINE

## 2024-12-20 PROCEDURE — 78452 HT MUSCLE IMAGE SPECT MULT: CPT

## 2024-12-20 PROCEDURE — A9502 TC99M TETROFOSMIN: HCPCS

## 2024-12-20 PROCEDURE — 78452 HT MUSCLE IMAGE SPECT MULT: CPT | Performed by: INTERNAL MEDICINE

## 2024-12-20 PROCEDURE — 93017 CV STRESS TEST TRACING ONLY: CPT

## 2024-12-20 PROCEDURE — 93018 CV STRESS TEST I&R ONLY: CPT | Performed by: INTERNAL MEDICINE

## 2024-12-23 LAB
CHEST PAIN STATEMENT: NORMAL
MAX DIASTOLIC BP: 92 MMHG
MAX PREDICTED HEART RATE: 168 BPM
PROTOCOL NAME: NORMAL
REASON FOR TERMINATION: NORMAL
STRESS POST EXERCISE DUR MIN: 2 MIN
STRESS POST EXERCISE DUR SEC: 50 SEC
STRESS POST PEAK HR: 155 BPM
STRESS POST PEAK SYSTOLIC BP: 170 MMHG
TARGET HR FORMULA: NORMAL
TEST INDICATION: NORMAL

## 2024-12-26 ENCOUNTER — RESULTS FOLLOW-UP (OUTPATIENT)
Dept: CARDIOLOGY CLINIC | Facility: CLINIC | Age: 54
End: 2024-12-26

## 2024-12-26 DIAGNOSIS — I48.91 ATRIAL FIBRILLATION WITH RAPID VENTRICULAR RESPONSE (HCC): ICD-10-CM

## 2024-12-26 NOTE — TELEPHONE ENCOUNTER
Medication: Apixaban    Dose/Frequency: 5mg 2x/day    Quantity: 60 R2    Pharmacy: [facility] [address] [place] [address]    Office:   [] PCP/Provider -   [x] Speciality/Provider - Dr Ponce    Does the patient have enough for 3 days?   [] Yes   [x] No - Send as HP to POD

## 2025-01-18 LAB
ALBUMIN SERPL-MCNC: 3.8 G/DL (ref 3.5–5.7)
ALP SERPL-CCNC: 65 U/L (ref 35–120)
ALT SERPL-CCNC: 13 U/L
ANION GAP SERPL CALCULATED.3IONS-SCNC: 8 MMOL/L (ref 3–11)
AST SERPL-CCNC: 14 U/L
BILIRUB SERPL-MCNC: 0.6 MG/DL (ref 0.2–1)
BUN SERPL-MCNC: 14 MG/DL (ref 7–25)
CALCIUM SERPL-MCNC: 8.9 MG/DL (ref 8.5–10.5)
CHLORIDE SERPL-SCNC: 107 MMOL/L (ref 100–109)
CHOLEST SERPL-MCNC: 181 MG/DL
CHOLEST/HDLC SERPL: 2.5 {RATIO}
CO2 SERPL-SCNC: 28 MMOL/L (ref 21–31)
CREAT SERPL-MCNC: 0.82 MG/DL (ref 0.4–1.1)
CYTOLOGY CMNT CVX/VAG CYTO-IMP: NORMAL
GFR/BSA.PRED SERPLBLD CYS-BASED-ARV: 86 ML/MIN/{1.73_M2}
GLUCOSE SERPL-MCNC: 88 MG/DL (ref 65–99)
HDLC SERPL-MCNC: 73 MG/DL (ref 23–92)
LDLC SERPL CALC-MCNC: 92 MG/DL
NONHDLC SERPL-MCNC: 108 MG/DL
POTASSIUM SERPL-SCNC: 4.5 MMOL/L (ref 3.5–5.2)
PROT SERPL-MCNC: 6.8 G/DL (ref 6.3–8.3)
SODIUM SERPL-SCNC: 143 MMOL/L (ref 135–145)
TRIGL SERPL-MCNC: 81 MG/DL

## 2025-01-21 ENCOUNTER — NURSE TRIAGE (OUTPATIENT)
Age: 55
End: 2025-01-21

## 2025-01-21 NOTE — TELEPHONE ENCOUNTER
"Patient  - on communication consent- calling as patient is at work and currently unavailable. Report red area 12 o'clock on Left breast. Notes previous lump - lump still present but the is area now with spreading redness, heat, tenderness, and somewhat rough texture. Denies fever or nipple changes. Unavailable for appointment today. Scheduled for tomorrow.    Reason for Disposition  • Breast looks infected (spreading redness, feels hot or painful to touch) and no fever    Answer Assessment - Initial Assessment Questions  1. SYMPTOM: \"What's the main symptom you're concerned about?\"  (e.g., lump, nipple discharge, pain, rash )      Red area - spreading, more rough texture, tender, lump in area, hot to touch  2. LOCATION: \"Where is the s/s located?\"      1 o'clock left breast  3. ONSET: \"When did s/s  start?\"       A couple months ago but redness/pain x2-3 weeks now worsening  4. PRIOR HISTORY: \"Do you have any history of prior problems with your breasts?\" (e.g., breast cancer, breast implant, fibrocystic breast disease)      Dense breast with makers  5. CAUSE: \"What do you think is causing this symptom?\"      Unsure   6. OTHER SYMPTOMS: \"Do you have any other symptoms?\" (e.g., fever, breast pain, nipple discharge, redness or rash)      Denies fever, nipple changes  7. PREGNANCY-BREASTFEEDING: \"Is there any chance you are pregnant?\" \"When was your last menstrual period?\" \"Are you breastfeeding?\"      N/A    Protocols used: Breast Symptoms-Adult-[place]    "

## 2025-01-22 ENCOUNTER — OFFICE VISIT (OUTPATIENT)
Dept: OBGYN CLINIC | Facility: CLINIC | Age: 55
End: 2025-01-22
Payer: COMMERCIAL

## 2025-01-22 VITALS
BODY MASS INDEX: 31.36 KG/M2 | WEIGHT: 177 LBS | SYSTOLIC BLOOD PRESSURE: 108 MMHG | HEIGHT: 63 IN | DIASTOLIC BLOOD PRESSURE: 66 MMHG

## 2025-01-22 DIAGNOSIS — R23.4 BREAST SKIN CHANGES: Primary | ICD-10-CM

## 2025-01-22 PROCEDURE — 99213 OFFICE O/P EST LOW 20 MIN: CPT | Performed by: PHYSICIAN ASSISTANT

## 2025-01-22 NOTE — PATIENT INSTRUCTIONS
Go for L breast imaging.    Follow up with breast specialist.    To be seen in the ED if area spreads or fever/chills develop.    Call if symptoms worsen or change.

## 2025-01-22 NOTE — PROGRESS NOTES
"Assessment/Plan:      Diagnoses and all orders for this visit:    Breast skin changes  -     US breast left limited (diagnostic); Future  -     Ambulatory Referral to Surgical Oncology; Future        Order for repeat L breast U/S entered, as well as ASAP referral to breast specialist.  Needs possible biopsy.  To be seen in the ED if area spreads or fever/chills develop.  F/u to depend on results.  Call if symptoms worsen or change.    Subjective:     Patient ID: Kristal Corrigan is a 54 y.o. female.    Patient is here with complaint of skin changes to L breast.  States they have been present for over a month.  Experiencing erythema and mild swelling of the area, but no pain.  Skin has started to flake at the surface.  Imaging of the L breast in December showed a possible sebaceous cyst.  Patient denies nipple discharge and new masses.  No changes to the R breast.  No fever/chills.  Area is stable; it is not spreading or getting worse.        Review of Systems   Constitutional:  Negative for chills and fever.   Cardiovascular:         Erythema and skin swelling with burning sensation in upper inner L breast.  Flaking of skin present.  Denies rash, nipple discharge, new masses, and pain/tenderness.  No changes to R breast.         Objective:  Visit Vitals  /66 (BP Location: Left arm, Patient Position: Sitting, Cuff Size: Adult)   Ht 5' 3\" (1.6 m)   Wt 80.3 kg (177 lb)   LMP 09/30/2024 (Approximate)   BMI 31.35 kg/m²   OB Status Postmenopausal   Smoking Status Never   BSA 1.84 m²         Physical Exam  Vitals reviewed.   Constitutional:       Appearance: Normal appearance. She is well-developed.   Chest:   Breasts:     Breasts are symmetrical.      Right: Normal. No swelling, bleeding, inverted nipple, mass, nipple discharge, skin change or tenderness.      Left: Skin change present. No swelling, bleeding, inverted nipple, mass, nipple discharge or tenderness.          Comments: Several cm raised, nontender " circular area with soft borders of erythema with central flaking of skin.  Raised area feels turgid.  Musculoskeletal:      Cervical back: Neck supple.   Lymphadenopathy:      Cervical: No cervical adenopathy.      Upper Body:      Right upper body: No supraclavicular or axillary adenopathy.      Left upper body: No supraclavicular or axillary adenopathy.   Skin:     General: Skin is warm and dry.   Neurological:      Mental Status: She is alert and oriented to person, place, and time.   Psychiatric:         Mood and Affect: Mood normal.         Behavior: Behavior normal. Behavior is cooperative.         Thought Content: Thought content normal.         Judgment: Judgment normal.

## 2025-01-23 ENCOUNTER — DOCUMENTATION (OUTPATIENT)
Dept: HEMATOLOGY ONCOLOGY | Facility: CLINIC | Age: 55
End: 2025-01-23

## 2025-01-23 NOTE — PROGRESS NOTES
Referral to surg onc received.  Chart reviewed by oncology nurse navigator at this time.      Diagnosis: breast skin changes  After review of chart, instructions for scheduling added to referral and sent to be scheduled as advised.

## 2025-01-24 ENCOUNTER — TELEPHONE (OUTPATIENT)
Age: 55
End: 2025-01-24

## 2025-01-24 NOTE — TELEPHONE ENCOUNTER
Patient scheduled for 2/3/25 at 10:00 am with Penelope Siddiqui at [facility]. Patient informed us of US done today, 1/24. Unsure if this would change anything about the referral, referring this over to NN for guidance.

## 2025-01-27 ENCOUNTER — RESULTS FOLLOW-UP (OUTPATIENT)
Dept: OBGYN CLINIC | Facility: CLINIC | Age: 55
End: 2025-01-27

## 2025-01-27 ENCOUNTER — TELEPHONE (OUTPATIENT)
Dept: SURGICAL ONCOLOGY | Facility: CLINIC | Age: 55
End: 2025-01-27

## 2025-01-27 ENCOUNTER — OFFICE VISIT (OUTPATIENT)
Dept: SURGICAL ONCOLOGY | Facility: CLINIC | Age: 55
End: 2025-01-27
Payer: COMMERCIAL

## 2025-01-27 VITALS
TEMPERATURE: 97.9 F | HEART RATE: 77 BPM | HEIGHT: 63 IN | DIASTOLIC BLOOD PRESSURE: 70 MMHG | WEIGHT: 176 LBS | BODY MASS INDEX: 31.18 KG/M2 | RESPIRATION RATE: 16 BRPM | SYSTOLIC BLOOD PRESSURE: 116 MMHG | OXYGEN SATURATION: 99 %

## 2025-01-27 DIAGNOSIS — N61.1 BREAST ABSCESS: Primary | ICD-10-CM

## 2025-01-27 DIAGNOSIS — R23.4 BREAST SKIN CHANGES: ICD-10-CM

## 2025-01-27 PROCEDURE — 99244 OFF/OP CNSLTJ NEW/EST MOD 40: CPT

## 2025-01-27 PROCEDURE — 87205 SMEAR GRAM STAIN: CPT

## 2025-01-27 PROCEDURE — 87077 CULTURE AEROBIC IDENTIFY: CPT

## 2025-01-27 PROCEDURE — 87070 CULTURE OTHR SPECIMN AEROBIC: CPT

## 2025-01-27 NOTE — TELEPHONE ENCOUNTER
Patient wanted to come in today, Rositaline called me to see if she could be scheduled to see Penelope today and 2:30 pm ok for patient and ARPIT Siddiqui   and Left message for Maura MUNSON to assist in Consult instead of Office visit

## 2025-01-27 NOTE — TELEPHONE ENCOUNTER
Called patient and left a voicemail. Advised that Penelope Gordonsanaznaya has open appointments today and Wednesday at [place] if she would like to reschedule her appointment for something sooner. [facility] number was left for call back.

## 2025-01-27 NOTE — PROGRESS NOTES
"Name: Kristal Corrigan      : 1970      MRN: [id number]  Encounter Provider: JUAN J Murphy  Encounter Date: 2025   Encounter department: [facility] [place]  :  Assessment & Plan  Breast skin changes  Patient is a 54 year old female presenting for a consult for breast skin changes. She was referred by her gyn. She had a b/l dx mammo and u/s of the left breast on 24 which demonstrated \"completely intradermal subcentimeter mass-like area in the left periareolar breast is compatible with a benign probably inflammatory skin lesion, such as a sebaceous cyst.\" She saw her gyn on 25 with complaints of continued skin changes to the left breast for breast over 1 month. At that time she mentioned erythema and mild swelling of the area, but no pain. Skin had started to flake at the surface. Her gyn sent her for dx u/s of left breast on 25, results were as follows, \"at the 10 to 11 o'clock position of the left breast 3 to 4 cm from the nipple, there is a 2.2 x 0.9 x 2.3 cm oval heterogeneously hypoechoic mass with cystic characteristics and peripheral vascularity. Findings likely reflect an infectious inflammatory process with a more discrete 2.3 cm possible small abscess communicating with a superficial area of suspected infection. Antibiotic treatment with short-term follow-up ultrasound was recommended to confirm resolution of findings.\" She states she was placed on Keflex yesterday. On clinical exam, patient has erythema with abscess present at the 10 o'clock position approx 4 cm FN. Abscess is tender to touch. With patients consent I used an 18 g needle to aspirate the contents within the abscess. I obtained 2 cc of purulent, blood-tinged fluid with intent to send for a wound culture. I instructed her to continue keflex as prescribed. I will call her with wound culture results and will change antibiotic if needed. I instructed her to use warm compress " "in the interim. I will plan to see her next week to ensure resolution of symptoms. All questions were answered today.    Orders:  •  Ambulatory Referral to Surgical Oncology    Breast abscess  - no signs of systemic infection  Orders:  •  Wound culture and Gram stain; Future           Review of Systems   Constitutional:  Negative for activity change, appetite change, fatigue and unexpected weight change.   Respiratory:  Negative for cough and shortness of breath.    Cardiovascular:  Negative for chest pain.   Gastrointestinal:  Negative for abdominal pain, diarrhea, nausea and vomiting.   Endocrine: Negative for heat intolerance.   Musculoskeletal:  Negative for arthralgias, back pain and myalgias.   Skin:  Negative for rash.   Neurological:  Negative for weakness and headaches.   Hematological:  Negative for adenopathy.    A complete review of systems is negative other than that noted above in the HPI.         Objective   /70 (Patient Position: Sitting, Cuff Size: Standard)   Pulse 77   Temp 97.9 °F (36.6 °C) (Temporal)   Resp 16   Ht 5' 3\" (1.6 m)   Wt 79.8 kg (176 lb)   LMP 09/30/2024 (Approximate)   SpO2 99%   BMI 31.18 kg/m²     Pain Screening:     ECOG    Physical Exam  Constitutional:       General: She is not in acute distress.     Appearance: Normal appearance.   Cardiovascular:      Rate and Rhythm: Normal rate and regular rhythm.      Pulses: Normal pulses.      Heart sounds: Normal heart sounds.   Pulmonary:      Effort: Pulmonary effort is normal.      Breath sounds: Normal breath sounds.   Chest:      Chest wall: No mass.   Breasts:     Right: No swelling, bleeding, inverted nipple, mass, nipple discharge, skin change or tenderness.      Left: Skin change and tenderness present. No swelling, bleeding, inverted nipple, mass or nipple discharge.       Abdominal:      General: Abdomen is flat.      Palpations: Abdomen is soft.   Lymphadenopathy:      Upper Body:      Right upper body: No " supraclavicular, axillary or pectoral adenopathy.      Left upper body: No supraclavicular, axillary or pectoral adenopathy.   Skin:     General: Skin is warm.   Neurological:      General: No focal deficit present.      Mental Status: She is alert and oriented to person, place, and time.   Psychiatric:         Mood and Affect: Mood normal.         Behavior: Behavior normal.          Labs: I have reviewed pertinent labs.   Orders Only on 01/18/2025   Component Date Value Ref Range Status   • Glucose, Random 01/18/2025 88  65 - 99 mg/dL Final   • BUN 01/18/2025 14  7 - 25 mg/dL Final   • Creatinine 01/18/2025 0.82  0.40 - 1.10 mg/dL Final   • Sodium 01/18/2025 143  135 - 145 mmol/L Final   • Potassium 01/18/2025 4.5  3.5 - 5.2 mmol/L Final   • Chloride 01/18/2025 107  100 - 109 mmol/L Final   • CO2 01/18/2025 28  21 - 31 mmol/L Final   • Calcium 01/18/2025 8.9  8.5 - 10.5 mg/dL Final   • Alkaline Phosphatase 01/18/2025 65  35 - 120 U/L Final   • Albumin 01/18/2025 3.8  3.5 - 5.7 g/dL Final   • TOTAL BILIRUBIN 01/18/2025 0.6  0.2 - 1.0 mg/dL Final    Comment: Eltrombopag and its metabolites may interfere with this assay causing   erroneously high patient results.     • Protein, Total 01/18/2025 6.8  6.3 - 8.3 g/dL Final   • AST 01/18/2025 14  <41 U/L Final   • ALT 01/18/2025 13  <56 U/L Final   • ANION GAP 01/18/2025 8  3 - 11 Final   • eGFR 01/18/2025 86  >59 Final   • Comment 01/18/2025 (Note)   Final    Comment:  Interpretive information: calculated GFR                                              Units: mL/min per 1.73 square meters   Reported eGFR is based on the CKD-EPI 2021 creatinine equation that   does not use a race coefficient and conforms to the NKF-ASN Task   Force Recommendations.   Note: Calculated GFR may not be an accurate indicator of renal   function if the patient's renal function is not in a steady state.                                              GFR Categories in Chronic Kidney Disease  (CKD):   GFR        GFR (mL/min/1.73 square meters)   Category:                   Interpretation:   G1         90 or greater    Normal or high*   G2         60 - 89          Mild decrease*   G3a        45 - 59          Mild to moderate decrease   G3b        30 - 44          Moderate to severe decrease   G4         15 - 29          Severe decrease   G5         14 or less       Kidney failure                                              *In the absence of evidence of kidney damage, neither G                           FR category G1   or G2 fulfill the criteria for CKD. Kidney Int Suppl 2013, 3: 1-1 50.     • Cholesterol, Total 01/18/2025 181  <200 mg/dL Final   • Triglycerides 01/18/2025 81  <150 mg/dL Final   • HDL Cholesterol 01/18/2025 73  23 - 92 mg/dL Final   • Non HDL Chol. (LDL+VLDL) 01/18/2025 108  <160 mg/dL Final   • Chol HDLC Ratio 01/18/2025 2.5   Final   • LDL Calculated 01/18/2025 92  <130 mg/dL Final    Comment: LDL Cholesterol was calculated using the Friedewald equation. Direct   measurement of LDL is not indicated for this patient based on \[facility]\""'s   analytical algorithm for measurement of LDL Cholesterol.

## 2025-01-30 LAB
BACTERIA WND AEROBE CULT: ABNORMAL
GRAM STN SPEC: ABNORMAL

## 2025-01-31 ENCOUNTER — RESULTS FOLLOW-UP (OUTPATIENT)
Dept: SURGICAL ONCOLOGY | Facility: CLINIC | Age: 55
End: 2025-01-31

## 2025-02-01 DIAGNOSIS — I48.91 ATRIAL FIBRILLATION WITH RAPID VENTRICULAR RESPONSE (HCC): ICD-10-CM

## 2025-02-05 ENCOUNTER — OFFICE VISIT (OUTPATIENT)
Dept: SURGICAL ONCOLOGY | Facility: CLINIC | Age: 55
End: 2025-02-05
Payer: COMMERCIAL

## 2025-02-05 ENCOUNTER — OFFICE VISIT (OUTPATIENT)
Dept: CARDIOLOGY CLINIC | Facility: CLINIC | Age: 55
End: 2025-02-05
Payer: COMMERCIAL

## 2025-02-05 VITALS
DIASTOLIC BLOOD PRESSURE: 68 MMHG | RESPIRATION RATE: 15 BRPM | WEIGHT: 177 LBS | BODY MASS INDEX: 31.36 KG/M2 | SYSTOLIC BLOOD PRESSURE: 112 MMHG | OXYGEN SATURATION: 98 % | HEIGHT: 63 IN | TEMPERATURE: 96.6 F | HEART RATE: 100 BPM

## 2025-02-05 VITALS
OXYGEN SATURATION: 97 % | BODY MASS INDEX: 28.53 KG/M2 | HEART RATE: 78 BPM | DIASTOLIC BLOOD PRESSURE: 68 MMHG | WEIGHT: 161 LBS | HEIGHT: 63 IN | TEMPERATURE: 95.6 F | SYSTOLIC BLOOD PRESSURE: 124 MMHG

## 2025-02-05 DIAGNOSIS — R07.89 CHEST TIGHTNESS: ICD-10-CM

## 2025-02-05 DIAGNOSIS — R55 SYNCOPE, UNSPECIFIED SYNCOPE TYPE: ICD-10-CM

## 2025-02-05 DIAGNOSIS — I48.0 PAROXYSMAL ATRIAL FIBRILLATION (HCC): Primary | ICD-10-CM

## 2025-02-05 DIAGNOSIS — E78.00 PURE HYPERCHOLESTEROLEMIA: ICD-10-CM

## 2025-02-05 DIAGNOSIS — E66.3 OVERWEIGHT (BMI 25.0-29.9): ICD-10-CM

## 2025-02-05 DIAGNOSIS — N61.1 BREAST ABSCESS: Primary | ICD-10-CM

## 2025-02-05 PROCEDURE — 93000 ELECTROCARDIOGRAM COMPLETE: CPT | Performed by: INTERNAL MEDICINE

## 2025-02-05 PROCEDURE — 99213 OFFICE O/P EST LOW 20 MIN: CPT

## 2025-02-05 PROCEDURE — 99214 OFFICE O/P EST MOD 30 MIN: CPT | Performed by: INTERNAL MEDICINE

## 2025-02-05 RX ORDER — FLECAINIDE ACETATE 100 MG/1
100 TABLET ORAL 2 TIMES DAILY
Qty: 60 TABLET | Refills: 3 | Status: SHIPPED | OUTPATIENT
Start: 2025-02-05 | End: 2025-02-05

## 2025-02-05 RX ORDER — METOPROLOL SUCCINATE 25 MG/1
25 TABLET, EXTENDED RELEASE ORAL DAILY
Qty: 90 TABLET | Refills: 1 | Status: SHIPPED | OUTPATIENT
Start: 2025-02-05

## 2025-02-05 RX ORDER — CEPHALEXIN 500 MG/1
CAPSULE ORAL
COMMUNITY
Start: 2025-01-25 | End: 2025-02-05

## 2025-02-05 RX ORDER — FLECAINIDE ACETATE 100 MG/1
100 TABLET ORAL 2 TIMES DAILY
Qty: 180 TABLET | Refills: 1 | Status: SHIPPED | OUTPATIENT
Start: 2025-02-05

## 2025-02-05 RX ORDER — ROSUVASTATIN CALCIUM 5 MG/1
5 TABLET, COATED ORAL
Qty: 90 TABLET | Refills: 3 | Status: SHIPPED | OUTPATIENT
Start: 2025-02-05

## 2025-02-05 NOTE — PROGRESS NOTES
Name: Kristal Corrigan      : 1970      MRN: [id number]  Encounter Provider: JUAN J Murphy  Encounter Date: 2025   Encounter department: [facility] [place]  :  Assessment & Plan  Breast abscess  Patient is a 54 year old female presenting for a 1 week follow for left breast abscess. She had a dx u/s of left breast on 25 which demonstrated an abscess at the 10 to 11 o'clock position of the left breast 3 to 4 cm from the nipple. This measured 2.2 x 0.9 x 2.3 cm . Patient was placed on Keflex on . At her appointment last week I was able to aspirate abscess for wound culture. Culture grew Kocuria rhizophila group -  Gram Positive Cocci. She has been using warm compress and states she was taking antibiotic as prescribed. Today, she presents with continued redness, inflammation, and pain at abscess site. She denies drainage. No fevers or chills. I told her to discontinue Keflex. I have consulted Infectious Disease for antimicrobial guidance. I am going to switch her to Amoxicillin. I instructed her to take 2 tablets up front as a loading dose, then she may take as prescribed 1 tablet every 12 hours. I will plan to see her back in 1 week to ensure resolution. All questions were answered today.  Orders:  •  amoxicillin-clavulanate (AUGMENTIN) 875-125 mg per tablet; Take 1 tablet by mouth every 12 (twelve) hours for 7 days Take 2 tablets up front           Review of Systems   Constitutional:  Negative for activity change, appetite change, fatigue, fever and unexpected weight change.   Respiratory:  Negative for cough and shortness of breath.    Cardiovascular:  Negative for chest pain.   Gastrointestinal:  Negative for abdominal pain, diarrhea, nausea and vomiting.   Endocrine: Negative for heat intolerance.   Musculoskeletal:  Negative for arthralgias, back pain and myalgias.   Skin:  Negative for rash.   Neurological:  Negative for weakness and headaches.  "  Hematological:  Negative for adenopathy.    A complete review of systems is negative other than that noted above in the HPI.         Objective   /68 (BP Location: Left arm, Patient Position: Sitting, Cuff Size: Standard)   Pulse 100   Temp (!) 96.6 °F (35.9 °C) (Temporal)   Resp 15   Ht 5' 3\" (1.6 m)   Wt 80.3 kg (177 lb)   LMP 09/30/2024 (Approximate)   SpO2 98%   BMI 31.35 kg/m²     Pain Screening:  Pain Score:   5  ECOG    Physical Exam  Constitutional:       General: She is not in acute distress.     Appearance: Normal appearance.   Cardiovascular:      Rate and Rhythm: Normal rate and regular rhythm.      Pulses: Normal pulses.      Heart sounds: Normal heart sounds.   Pulmonary:      Effort: Pulmonary effort is normal.      Breath sounds: Normal breath sounds.   Chest:      Chest wall: No mass.   Breasts:     Right: No swelling, bleeding, inverted nipple, mass, nipple discharge, skin change or tenderness.      Left: Skin change and tenderness present. No swelling, bleeding, inverted nipple, mass or nipple discharge.       Abdominal:      General: Abdomen is flat.      Palpations: Abdomen is soft.   Lymphadenopathy:      Upper Body:      Right upper body: No supraclavicular, axillary or pectoral adenopathy.      Left upper body: No supraclavicular, axillary or pectoral adenopathy.   Skin:     General: Skin is warm.   Neurological:      General: No focal deficit present.      Mental Status: She is alert and oriented to person, place, and time.   Psychiatric:         Mood and Affect: Mood normal.         Behavior: Behavior normal.          Labs: I have reviewed pertinent labs.   Office Visit on 01/27/2025   Component Date Value Ref Range Status   • Wound Culture 01/27/2025 Growth in Broth culture only - Kocuria rhizophila group -  Gram Positive Cocci (A)   Final   • Gram Stain Result 01/27/2025 No Polys or Bacteria seen   Final   Orders Only on 01/18/2025   Component Date Value Ref Range Status "   • Glucose, Random 01/18/2025 88  65 - 99 mg/dL Final   • BUN 01/18/2025 14  7 - 25 mg/dL Final   • Creatinine 01/18/2025 0.82  0.40 - 1.10 mg/dL Final   • Sodium 01/18/2025 143  135 - 145 mmol/L Final   • Potassium 01/18/2025 4.5  3.5 - 5.2 mmol/L Final   • Chloride 01/18/2025 107  100 - 109 mmol/L Final   • CO2 01/18/2025 28  21 - 31 mmol/L Final   • Calcium 01/18/2025 8.9  8.5 - 10.5 mg/dL Final   • Alkaline Phosphatase 01/18/2025 65  35 - 120 U/L Final   • Albumin 01/18/2025 3.8  3.5 - 5.7 g/dL Final   • TOTAL BILIRUBIN 01/18/2025 0.6  0.2 - 1.0 mg/dL Final    Comment: Eltrombopag and its metabolites may interfere with this assay causing   erroneously high patient results.     • Protein, Total 01/18/2025 6.8  6.3 - 8.3 g/dL Final   • AST 01/18/2025 14  <41 U/L Final   • ALT 01/18/2025 13  <56 U/L Final   • ANION GAP 01/18/2025 8  3 - 11 Final   • eGFR 01/18/2025 86  >59 Final   • Comment 01/18/2025 (Note)   Final    Comment:  Interpretive information: calculated GFR                                              Units: mL/min per 1.73 square meters   Reported eGFR is based on the CKD-EPI 2021 creatinine equation that   does not use a race coefficient and conforms to the NKF-ASN Task   Force Recommendations.   Note: Calculated GFR may not be an accurate indicator of renal   function if the patient's renal function is not in a steady state.                                              GFR Categories in Chronic Kidney Disease (CKD):   GFR        GFR (mL/min/1.73 square meters)   Category:                   Interpretation:   G1         90 or greater    Normal or high*   G2         60 - 89          Mild decrease*   G3a        45 - 59          Mild to moderate decrease   G3b        30 - 44          Moderate to severe decrease   G4         15 - 29          Severe decrease   G5         14 or less       Kidney failure                                              *In the absence of evidence of kidney damage, neither G                            FR category G1   or G2 fulfill the criteria for CKD. Kidney Int Suppl 2013, 3: 1-1 50.     • Cholesterol, Total 01/18/2025 181  <200 mg/dL Final   • Triglycerides 01/18/2025 81  <150 mg/dL Final   • HDL Cholesterol 01/18/2025 73  23 - 92 mg/dL Final   • Non HDL Chol. (LDL+VLDL) 01/18/2025 108  <160 mg/dL Final   • Chol HDLC Ratio 01/18/2025 2.5   Final   • LDL Calculated 01/18/2025 92  <130 mg/dL Final    Comment: LDL Cholesterol was calculated using the Friedewald equation. Direct   measurement of LDL is not indicated for this patient based on [facility]'s   analytical algorithm for measurement of LDL Cholesterol.

## 2025-02-05 NOTE — ASSESSMENT & PLAN NOTE
Overview  10/21/24 ED visit with syncope  Reportedly was in her usual state of health at work when she suddenly developed shortness of breath/chest pain followed by lightheadedness dizziness send in brief syncope  Labs, CT dissection protocol was negative and she was recommended admission for monitoring, but she left AMA  12/2024: No further issues with dizziness/syncope  1/2025: Remains free of dizziness or syncope  Impression  May have been related to baseline low blood pressure with A-fib causing further drop in pressure  Plan  Continue metoprolol succinate 25 mg daily  Rhythm control for A-fib   Monitor for recurrence of symptoms

## 2025-02-05 NOTE — ASSESSMENT & PLAN NOTE
Patient is a 54 year old female presenting for a 1 week follow for left breast abscess. She had a dx u/s of left breast on 1/24/25 which demonstrated an abscess at the 10 to 11 o'clock position of the left breast 3 to 4 cm from the nipple. This measured 2.2 x 0.9 x 2.3 cm . Patient was placed on Keflex on 1/25. At her appointment last week I was able to aspirate abscess for wound culture. Culture grew Kocuria rhizophila group -  Gram Positive Cocci. She has been using warm compress and states she was taking antibiotic as prescribed. Today, she presents with continued redness, inflammation, and pain at abscess site. She denies drainage. No fevers or chills. I told her to discontinue Keflex. I have consulted Infectious Disease for antimicrobial guidance. I am going to switch her to Amoxicillin. I instructed her to take 2 tablets up front as a loading dose, then she may take as prescribed 1 tablet every 12 hours. I will plan to see her back in 1 week to ensure resolution. All questions were answered today.  Orders:  •  amoxicillin-clavulanate (AUGMENTIN) 875-125 mg per tablet; Take 1 tablet by mouth every 12 (twelve) hours for 7 days Take 2 tablets up front
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
## 2025-02-12 ENCOUNTER — OFFICE VISIT (OUTPATIENT)
Dept: SURGICAL ONCOLOGY | Facility: CLINIC | Age: 55
End: 2025-02-12
Payer: COMMERCIAL

## 2025-02-12 ENCOUNTER — CLINICAL SUPPORT (OUTPATIENT)
Dept: CARDIOLOGY CLINIC | Facility: CLINIC | Age: 55
End: 2025-02-12

## 2025-02-12 VITALS
BODY MASS INDEX: 31.54 KG/M2 | HEART RATE: 66 BPM | DIASTOLIC BLOOD PRESSURE: 68 MMHG | HEIGHT: 63 IN | RESPIRATION RATE: 15 BRPM | SYSTOLIC BLOOD PRESSURE: 116 MMHG | OXYGEN SATURATION: 98 % | TEMPERATURE: 97.3 F | WEIGHT: 178 LBS

## 2025-02-12 VITALS
OXYGEN SATURATION: 99 % | WEIGHT: 178 LBS | BODY MASS INDEX: 31.54 KG/M2 | SYSTOLIC BLOOD PRESSURE: 112 MMHG | HEART RATE: 64 BPM | HEIGHT: 63 IN | DIASTOLIC BLOOD PRESSURE: 70 MMHG | TEMPERATURE: 97.8 F

## 2025-02-12 DIAGNOSIS — N61.1 BREAST ABSCESS: Primary | ICD-10-CM

## 2025-02-12 DIAGNOSIS — I48.0 PAROXYSMAL ATRIAL FIBRILLATION (HCC): Primary | ICD-10-CM

## 2025-02-12 PROCEDURE — 99214 OFFICE O/P EST MOD 30 MIN: CPT

## 2025-02-12 PROCEDURE — NURSE

## 2025-02-12 PROCEDURE — 10060 I&D ABSCESS SIMPLE/SINGLE: CPT

## 2025-02-12 NOTE — ASSESSMENT & PLAN NOTE
Patient is a 54 year old female presenting for 1 week follow for left breast abscess. She had a dx u/s of left breast on 1/24/25 which demonstrated an abscess at the 10 to 11 o'clock position measuring 2.2 x 0.9 x 2.3 cm. 3 month f/u u/s was recommended and is scheduled at [facility]. Patient was initially placed on Keflex following abscess diagnosis. Wound culture from aspiration grew Kocuria rhizophila gram +. I consulted ID who recommended antibiotic treatment be switched to Augmentin PO. She was instructed to take 1 tablet every 12 hours for 7 days. Today, she presents with continued redness at the abscess site. She notes warmth and there is tenderness on palpation. No fevers or chills. I evaluated the abscess with u/s and was able to visualize small fluid collection approximately 1.5 cm beneath the skin surface. I explained that often with abscesses the fluid collection needs to be removed in order to heal. I informed her that we can either take the antibiotic for another week to see if this resolves, or I can attempt an I&D to remove the fluid collection and then trial another week of the antibiotic. With patients consent she opted for an I&D. Written consent was obtained. Upon puncture of the abscess, I was able to express small amount of purulent drainage. I covered the wound with neosporin and gauze. I instructed her to use neosporin on the wound until closure. She should wash the breast with soap and water. She may take ibuprofen for breast pain following procedure. I instructed her to call if infection worsens. I will see her back in 1 week.  All questions were answered.

## 2025-02-12 NOTE — PROGRESS NOTES
Patient came in for a 1 week  EKG patient was started on flecainide 2/5/25  Patient states she is feeling fine and has no new heart related symptoms.

## 2025-02-12 NOTE — PROGRESS NOTES
Name: Kristal Corrigan      : 1970      MRN: [id number]  Encounter Provider: JUAN J Murphy  Encounter Date: 2025   Encounter department[facility] [place]  :  Assessment & Plan  Breast abscess  Patient is a 54 year old female presenting for 1 week follow for left breast abscess. She had a dx u/s of left breast on 25 which demonstrated an abscess at the 10 to 11 o'clock position measuring 2.2 x 0.9 x 2.3 cm. 3 month f/u u/s was recommended and is scheduled at [facility]. Patient was initially placed on Keflex following abscess diagnosis. Wound culture from aspiration grew Kocuria rhizophila gram +. I consulted ID who recommended antibiotic treatment be switched to Augmentin PO. She was instructed to take 1 tablet every 12 hours for 7 days. Today, she presents with continued redness at the abscess site. She notes warmth and there is tenderness on palpation. No fevers or chills. I evaluated the abscess with u/s and was able to visualize small fluid collection approximately 1.5 cm beneath the skin surface. I explained that often with abscesses the fluid collection needs to be removed in order to heal. I informed her that we can either take the antibiotic for another week to see if this resolves, or I can attempt an I&D to remove the fluid collection and then trial another week of the antibiotic. With patients consent she opted for an I&D. Written consent was obtained. Upon puncture of the abscess, I was able to express small amount of purulent drainage. I covered the wound with neosporin and gauze. I instructed her to use neosporin on the wound until closure. She should wash the breast with soap and water. She may take ibuprofen for breast pain following procedure. I instructed her to call if infection worsens. I will see her back in 1 week.  All questions were answered.     Incision and drain    Date/Time: 2025 1:00 PM    Performed by: Penelope Ascencio  JUAN J Siddiqui  Authorized by: JUAN J Murphy  Universal Protocol:  procedure performed by consultantConsent: Verbal consent obtained. Written consent obtained.  Risks and benefits: risks, benefits and alternatives were discussed  Consent given by: patient  Timeout called at: 2/12/2025 1:15 PM.  Patient understanding: patient states understanding of the procedure being performed  Patient consent: the patient's understanding of the procedure matches consent given  Procedure consent: procedure consent matches procedure scheduled  Relevant documents: relevant documents present and verified  Test results: test results available and properly labeled  Site marked: the operative site was marked  Radiology Images displayed and confirmed. If images not available, report reviewed: imaging studies available  Patient identity confirmed: verbally with patient    Patient location:  Bedside  Location:     Type:  Abscess    Size:  5 mm    Location:  Trunk    Trunk location:  L breast  Pre-procedure details:     Skin preparation:  Betadine  Anesthesia (see MAR for exact dosages):     Anesthesia method:  Local infiltration    Local anesthetic:  Lidocaine 1% w/o epi  Procedure details:     Complexity:  Simple    Needle aspiration: no      Incision types:  Stab incision    Scalpel blade:  10    Approach:  Puncture    Incision depth:  Dermal    Wound management:  Irrigated with saline and probed and deloculated    Drainage:  Bloody and purulent    Drainage amount:  Scant    Wound treatment:  Wound left open    Packing materials:  None  Post-procedure details:     Patient tolerance of procedure:  Tolerated well, no immediate complications       Review of Systems   Constitutional:  Negative for activity change, appetite change, fatigue and unexpected weight change.   Respiratory:  Negative for cough and shortness of breath.    Cardiovascular:  Negative for chest pain.   Gastrointestinal:  Negative for abdominal pain,  diarrhea, nausea and vomiting.   Endocrine: Negative for heat intolerance.   Musculoskeletal:  Negative for arthralgias, back pain and myalgias.   Skin:  Negative for rash.   Neurological:  Negative for weakness and headaches.   Hematological:  Negative for adenopathy.    A complete review of systems is negative other than that noted above in the HPI.    Objective     Vitals:    02/12/25 1302   BP: 116/68   Pulse: 66   Resp: 15   Temp: (!) 97.3 °F (36.3 °C)   SpO2: 98%       Pain Screening:  Pain Score:   2  ECOG    Physical Exam  Chest:   Breasts:     Left: Swelling, skin change and tenderness present. No bleeding, inverted nipple, mass or nipple discharge.          Comments: Continued left breast abscess. Redness, swelling, tenderness on palpation.         Labs: I have reviewed pertinent labs.   Office Visit on 01/27/2025   Component Date Value Ref Range Status   • Wound Culture 01/27/2025 Growth in Broth culture only - Kocuria rhizophila group -  Gram Positive Cocci (A)   Final   • Gram Stain Result 01/27/2025 No Polys or Bacteria seen   Final   Orders Only on 01/18/2025   Component Date Value Ref Range Status   • Glucose, Random 01/18/2025 88  65 - 99 mg/dL Final   • BUN 01/18/2025 14  7 - 25 mg/dL Final   • Creatinine 01/18/2025 0.82  0.40 - 1.10 mg/dL Final   • Sodium 01/18/2025 143  135 - 145 mmol/L Final   • Potassium 01/18/2025 4.5  3.5 - 5.2 mmol/L Final   • Chloride 01/18/2025 107  100 - 109 mmol/L Final   • CO2 01/18/2025 28  21 - 31 mmol/L Final   • Calcium 01/18/2025 8.9  8.5 - 10.5 mg/dL Final   • Alkaline Phosphatase 01/18/2025 65  35 - 120 U/L Final   • Albumin 01/18/2025 3.8  3.5 - 5.7 g/dL Final   • TOTAL BILIRUBIN 01/18/2025 0.6  0.2 - 1.0 mg/dL Final    Comment: Eltrombopag and its metabolites may interfere with this assay causing   erroneously high patient results.     • Protein, Total 01/18/2025 6.8  6.3 - 8.3 g/dL Final   • AST 01/18/2025 14  <41 U/L Final   • ALT 01/18/2025 13  <56 U/L Final    • ANION GAP 01/18/2025 8  3 - 11 Final   • eGFR 01/18/2025 86  >59 Final   • Comment 01/18/2025 (Note)   Final    Comment:  Interpretive information: calculated GFR                                              Units: mL/min per 1.73 square meters   Reported eGFR is based on the CKD-EPI 2021 creatinine equation that   does not use a race coefficient and conforms to the NKF-ASN Task   Force Recommendations.   Note: Calculated GFR may not be an accurate indicator of renal   function if the patient's renal function is not in a steady state.                                              GFR Categories in Chronic Kidney Disease (CKD):   GFR        GFR (mL/min/1.73 square meters)   Category:                   Interpretation:   G1         90 or greater    Normal or high*   G2         60 - 89          Mild decrease*   G3a        45 - 59          Mild to moderate decrease   G3b        30 - 44          Moderate to severe decrease   G4         15 - 29          Severe decrease   G5         14 or less       Kidney failure                                              *In the absence of evidence of kidney damage, neither G                           FR category G1   or G2 fulfill the criteria for CKD. Kidney Int Suppl 2013, 3: 1-1 50.     • Cholesterol, Total 01/18/2025 181  <200 mg/dL Final   • Triglycerides 01/18/2025 81  <150 mg/dL Final   • HDL Cholesterol 01/18/2025 73  23 - 92 mg/dL Final   • Non HDL Chol. (LDL+VLDL) 01/18/2025 108  <160 mg/dL Final   • Chol HDLC Ratio 01/18/2025 2.5   Final   • LDL Calculated 01/18/2025 92  <130 mg/dL Final    Comment: LDL Cholesterol was calculated using the Friedewald equation. Direct   measurement of LDL is not indicated for this patient based on [facility]'s   analytical algorithm for measurement of LDL Cholesterol.

## 2025-02-19 ENCOUNTER — OFFICE VISIT (OUTPATIENT)
Dept: SURGICAL ONCOLOGY | Facility: CLINIC | Age: 55
End: 2025-02-19

## 2025-02-19 VITALS
WEIGHT: 177 LBS | DIASTOLIC BLOOD PRESSURE: 78 MMHG | HEIGHT: 63 IN | HEART RATE: 65 BPM | RESPIRATION RATE: 18 BRPM | BODY MASS INDEX: 31.36 KG/M2 | OXYGEN SATURATION: 98 % | TEMPERATURE: 97.3 F | SYSTOLIC BLOOD PRESSURE: 112 MMHG

## 2025-02-19 DIAGNOSIS — N61.1 BREAST ABSCESS: Primary | ICD-10-CM

## 2025-02-19 PROCEDURE — 99024 POSTOP FOLLOW-UP VISIT: CPT

## 2025-02-19 NOTE — PROGRESS NOTES
Name: Kristal Corrigan      : 1970      MRN: [id number]  Encounter Provider: JUAN J Murphy  Encounter Date: 2025   Encounter department: [facility] [place]  :  Assessment & Plan  Breast abscess  Patient is a 54 year old female presenting for another 1 week follow for left breast abscess. She had a dx u/s of left breast on 25 which demonstrated an abscess at the 10 to 11 o'clock position measuring 2.2 x 0.9 x 2.3 cm. 3 month f/u u/s was recommended and is scheduled at [facility]. Patient was initially placed on Keflex following abscess diagnosis. Wound culture from aspiration grew Kocuria rhizophila gram +. I consulted ID who recommended antibiotic treatment be switched to Augmentin PO. She was instructed to take 1 tablet every 12 hours for 7 days. Last week she presented with continued redness at the abscess site. I performed an I&D and expressed small amount of purulent drainage. I prescribed her another week of Augmentin. Today, erythema is localized around abscess site, no longer diffusely spread at the central aspect of the left breast. She mentions a burning sensation at this time. She is tender on palpation. She is much improved from last week, however not fully healed yet. She states she has 5 days left of Augmentin. I am hopeful once she continues this course of antibx she should be almost healed. I will plan to set up virtual visit in 1 week. I am going to have her send me a photo of the breast at that time. She was instructed to call with worsening erythema or signs of infection. All questions were answered.         Review of Systems   Constitutional:  Negative for activity change, appetite change, fatigue and unexpected weight change.   Respiratory:  Negative for cough and shortness of breath.    Cardiovascular:  Negative for chest pain.   Gastrointestinal:  Negative for abdominal pain, diarrhea, nausea and vomiting.   Endocrine: Negative for heat  intolerance.   Musculoskeletal:  Negative for arthralgias, back pain and myalgias.   Skin:  Negative for rash.   Neurological:  Negative for weakness and headaches.   Hematological:  Negative for adenopathy.    A complete review of systems is negative other than that noted above in the HPI.    Objective     Pain Screening:  Pain Score:   8  ECOG    Physical Exam  Chest:   Breasts:     Right: No swelling, bleeding, inverted nipple, mass, nipple discharge, skin change or tenderness.      Left: Skin change and tenderness present. No swelling, bleeding, inverted nipple, mass or nipple discharge.          Comments: Abscess on left breast that is resolving         Labs: I have reviewed pertinent labs.  Office Visit on 01/27/2025   Component Date Value Ref Range Status   • Wound Culture 01/27/2025 Growth in Broth culture only - Kocuria rhizophila group -  Gram Positive Cocci (A)   Final   • Gram Stain Result 01/27/2025 No Polys or Bacteria seen   Final

## 2025-02-19 NOTE — ASSESSMENT & PLAN NOTE
Patient is a 54 year old female presenting for another 1 week follow for left breast abscess. She had a dx u/s of left breast on 1/24/25 which demonstrated an abscess at the 10 to 11 o'clock position measuring 2.2 x 0.9 x 2.3 cm. 3 month f/u u/s was recommended and is scheduled at [facility]. Patient was initially placed on Keflex following abscess diagnosis. Wound culture from aspiration grew Kocuria rhizophila gram +. I consulted ID who recommended antibiotic treatment be switched to Augmentin PO. She was instructed to take 1 tablet every 12 hours for 7 days. Last week she presented with continued redness at the abscess site. I performed an I&D and expressed small amount of purulent drainage. I prescribed her another week of Augmentin. Today, erythema is localized around abscess site, no longer diffusely spread at the central aspect of the left breast. She mentions a burning sensation at this time. She is tender on palpation. She is much improved from last week, however not fully healed yet. She states she has 5 days left of Augmentin. I am hopeful once she continues this course of antibx she should be almost healed. I will plan to set up virtual visit in 1 week. I am going to have her send me a photo of the breast at that time. She was instructed to call with worsening erythema or signs of infection. All questions were answered.

## 2025-02-26 ENCOUNTER — TELEPHONE (OUTPATIENT)
Dept: SURGICAL ONCOLOGY | Facility: CLINIC | Age: 55
End: 2025-02-26

## 2025-02-26 NOTE — TELEPHONE ENCOUNTER
Left message the virtual visit is cancelled for today, patient has overtime and PRIMO Siddiqui approved a Virtual visit in her 3 pm and later, Scheduled Tues 3/4/25 @ 3 pm and left message for patient and left hopeline if that date does not work,  will try to call patient again.

## 2025-03-04 ENCOUNTER — TELEMEDICINE (OUTPATIENT)
Dept: SURGICAL ONCOLOGY | Facility: CLINIC | Age: 55
End: 2025-03-04
Payer: COMMERCIAL

## 2025-03-04 DIAGNOSIS — N61.1 BREAST ABSCESS: Primary | ICD-10-CM

## 2025-03-04 PROCEDURE — 99212 OFFICE O/P EST SF 10 MIN: CPT

## 2025-03-04 NOTE — PROGRESS NOTES
Virtual Brief Visit  Name: Kristal Corrigan      : 1970      MRN: [id number]  Encounter Provider: JUAN J Murphy  Encounter Date: 3/4/2025   Encounter department: [facility] [place]  :  Assessment & Plan  Breast abscess  Patient is a 55 year old female with hx of left breast abscess. We spoke briefly on the phone today to discuss resolution of left breast abscess. She sent a photo via mychart of the resolving abscess, photo taken on 3/2. I ensured her that continued redness after a healed infection can occur because of the inflammatory process as the body is still working to clear debris and repair damaged tissue. The erythema is much improved. No active signs of infection. I instructed her to call in the future with questions or concerns. All questions were answered.         History of Present Illness   HPI    Administrative Statements   Encounter provider JUAN J Murphy    The Patient is located at Home and in the following state in which I hold an active license [place].    The patient was identified by name and date of birth. Kristal Corrigan was informed that this is a telemedicine visit and that the visit is being conducted through the Epic Embedded platform. She agrees to proceed..  My office door was closed. No one else was in the room.  She acknowledged consent and understanding of privacy and security of the video platform. The patient has agreed to participate and understands they can discontinue the visit at any time.    I have spent a total time of 5 minutes in caring for this patient on the day of the visit/encounter including Counseling / Coordination of care, Documenting in the medical record, and Obtaining or reviewing history  , not including the time spent for establishing the audio/video connection.

## 2025-03-04 NOTE — ASSESSMENT & PLAN NOTE
Patient is a 55 year old female with hx of left breast abscess. We spoke briefly on the phone today to discuss resolution of left breast abscess. She sent a photo via mychart of the resolving abscess, photo taken on 3/2. I ensured her that continued redness after a healed infection can occur because of the inflammatory process as the body is still working to clear debris and repair damaged tissue. The erythema is much improved. No active signs of infection. I instructed her to call in the future with questions or concerns. All questions were answered.

## 2025-04-21 ENCOUNTER — TELEPHONE (OUTPATIENT)
Dept: CARDIOLOGY CLINIC | Facility: CLINIC | Age: 55
End: 2025-04-21

## 2025-04-21 ENCOUNTER — NURSE TRIAGE (OUTPATIENT)
Dept: CARDIOLOGY CLINIC | Facility: CLINIC | Age: 55
End: 2025-04-21

## 2025-04-21 NOTE — PATIENT COMMUNICATION
FOLLOW UP: scheduled pt visit with Dr. Ponce 5/23.  Advised pt ED if symptoms reoccur/worsen.    REASON FOR CONVERSATION: No chief complaint on file.    SYMPTOMS: pt has been experiencing left sided chest tightness and sob for a few weeks.  She states it is happening at work, will last 5-10 minutes.  BP trends around 112/86, pulse 68-95 bpm. Denies palpitations.     OTHER: medications: eliquis 5 mg bid, flecainide 100 mg bid, metoprolol succinate 25 mg qd, rosuvastatin 5 mg qd

## 2025-04-21 NOTE — TELEPHONE ENCOUNTER
"Reason for Disposition  • All other patients with chest pain (Exception: Fleeting chest pain lasting a few seconds.)    Answer Assessment - Initial Assessment Questions  1. LOCATION: \"Where does it hurt?\"        Left side of chest  2. RADIATION: \"Does the pain go anywhere else?\" (e.g., into neck, jaw, arms, back)      Denies   3. ONSET: \"When did the chest pain begin?\" (Minutes, hours or days)       Couple of weeks ago   4. PATTERN: \"Does the pain come and go, or has it been constant since it started?\"  \"Does it get worse with exertion?\"       Comes and goes, occurs when at work  5. DURATION: \"How long does it last\" (e.g., seconds, minutes, hours)      5-10 minutes   6. SEVERITY: \"How bad is the pain?\"  (e.g., Scale 1-10; mild, moderate, or severe)      7/10   7. CARDIAC RISK FACTORS: \"Do you have any history of heart problems or risk factors for heart disease?\" (e.g., angina, prior heart attack; diabetes, high blood pressure, high cholesterol, smoker, or strong family history of heart disease)      Afib   8. PULMONARY RISK FACTORS: \"Do you have any history of lung disease?\"  (e.g., blood clots in lung, asthma, emphysema, birth control pills)     Denies   9. CAUSE: \"What do you think is causing the chest pain?\"      Unsure   10. OTHER SYMPTOMS: \"Do you have any other symptoms?\" (e.g., dizziness, nausea, vomiting, sweating, fever, difficulty breathing, cough)        Sob    Protocols used: Chest Pain-Adult-[place]    "

## 2025-04-21 NOTE — TELEPHONE ENCOUNTER
Caller: Kristal Corrigan     Doctor: Rodolfo Ponce MD     Reason for call: Pt sent a message to office in reference to Chest tightness, Instructed to call for appt. Triage unavailable. Please assist.      Call back#: [phone]

## 2025-04-22 NOTE — TELEPHONE ENCOUNTER
Received call from pt.  She thought her appointment was scheduled 4/23.  Offered pt sooner appointment with an AP, but she declined due to work schedule.  Advised pt if she continues to have chest pain, she should go to ED for evaluation. Pt verbalized understanding.

## 2025-04-22 NOTE — TELEPHONE ENCOUNTER
Called pt and left message to schedule nurse visit to check EKG and vitals per Dr. Ponce. Left office call back number.

## 2025-04-23 ENCOUNTER — CLINICAL SUPPORT (OUTPATIENT)
Dept: CARDIOLOGY CLINIC | Facility: CLINIC | Age: 55
End: 2025-04-23

## 2025-04-23 VITALS
TEMPERATURE: 98.3 F | BODY MASS INDEX: 31.54 KG/M2 | HEIGHT: 63 IN | OXYGEN SATURATION: 97 % | WEIGHT: 178 LBS | DIASTOLIC BLOOD PRESSURE: 72 MMHG | SYSTOLIC BLOOD PRESSURE: 128 MMHG

## 2025-04-23 DIAGNOSIS — I48.0 PAROXYSMAL ATRIAL FIBRILLATION (HCC): Primary | ICD-10-CM

## 2025-04-23 DIAGNOSIS — R07.89 CHEST TIGHTNESS: ICD-10-CM

## 2025-04-23 DIAGNOSIS — R00.2 PALPITATIONS: ICD-10-CM

## 2025-04-23 PROCEDURE — NURSE

## 2025-05-05 ENCOUNTER — APPOINTMENT (EMERGENCY)
Dept: RADIOLOGY | Facility: HOSPITAL | Age: 55
End: 2025-05-05
Payer: COMMERCIAL

## 2025-05-05 ENCOUNTER — TELEPHONE (OUTPATIENT)
Age: 55
End: 2025-05-05

## 2025-05-05 ENCOUNTER — APPOINTMENT (EMERGENCY)
Dept: CT IMAGING | Facility: HOSPITAL | Age: 55
End: 2025-05-05
Payer: COMMERCIAL

## 2025-05-05 ENCOUNTER — HOSPITAL ENCOUNTER (EMERGENCY)
Facility: HOSPITAL | Age: 55
Discharge: HOME | End: 2025-05-05
Attending: INTERNAL MEDICINE
Payer: COMMERCIAL

## 2025-05-05 VITALS
OXYGEN SATURATION: 100 % | BODY MASS INDEX: 31.01 KG/M2 | HEART RATE: 63 BPM | SYSTOLIC BLOOD PRESSURE: 114 MMHG | HEIGHT: 63 IN | DIASTOLIC BLOOD PRESSURE: 72 MMHG | TEMPERATURE: 97.6 F | RESPIRATION RATE: 16 BRPM | WEIGHT: 175 LBS

## 2025-05-05 DIAGNOSIS — R42 DIZZINESS: Primary | ICD-10-CM

## 2025-05-05 LAB
ALBUMIN SERPL BCG-MCNC: 3.9 G/DL (ref 3.5–5)
ALP SERPL-CCNC: 61 U/L (ref 34–104)
ALT SERPL W P-5'-P-CCNC: 14 U/L (ref 7–52)
ANION GAP SERPL CALCULATED.3IONS-SCNC: 9 MMOL/L (ref 4–13)
AST SERPL W P-5'-P-CCNC: 17 U/L (ref 13–39)
ATRIAL RATE: 85 BPM
BASOPHILS # BLD AUTO: 0.03 THOUSANDS/ÂΜL (ref 0–0.1)
BASOPHILS NFR BLD AUTO: 0 % (ref 0–1)
BILIRUB SERPL-MCNC: 0.55 MG/DL (ref 0.2–1)
BUN SERPL-MCNC: 11 MG/DL (ref 5–25)
CALCIUM SERPL-MCNC: 8.5 MG/DL (ref 8.4–10.2)
CARDIAC TROPONIN I PNL SERPL HS: <2 NG/L (ref ?–50)
CARDIAC TROPONIN I PNL SERPL HS: <2 NG/L (ref ?–50)
CHLORIDE SERPL-SCNC: 108 MMOL/L (ref 96–108)
CK SERPL-CCNC: 75 U/L (ref 26–192)
CO2 SERPL-SCNC: 22 MMOL/L (ref 21–32)
CREAT SERPL-MCNC: 0.76 MG/DL (ref 0.6–1.3)
EOSINOPHIL # BLD AUTO: 0.44 THOUSAND/ÂΜL (ref 0–0.61)
EOSINOPHIL NFR BLD AUTO: 6 % (ref 0–6)
ERYTHROCYTE [DISTWIDTH] IN BLOOD BY AUTOMATED COUNT: 13.1 % (ref 11.6–15.1)
GFR SERPL CREATININE-BSD FRML MDRD: 89 ML/MIN/1.73SQ M
GLUCOSE SERPL-MCNC: 99 MG/DL (ref 65–140)
HCT VFR BLD AUTO: 40.4 % (ref 34.8–46.1)
HGB BLD-MCNC: 13.1 G/DL (ref 11.5–15.4)
IMM GRANULOCYTES # BLD AUTO: 0.02 THOUSAND/UL (ref 0–0.2)
IMM GRANULOCYTES NFR BLD AUTO: 0 % (ref 0–2)
LYMPHOCYTES # BLD AUTO: 1.55 THOUSANDS/ÂΜL (ref 0.6–4.47)
LYMPHOCYTES NFR BLD AUTO: 21 % (ref 14–44)
MAGNESIUM SERPL-MCNC: 1.9 MG/DL (ref 1.9–2.7)
MCH RBC QN AUTO: 29.7 PG (ref 26.8–34.3)
MCHC RBC AUTO-ENTMCNC: 32.4 G/DL (ref 31.4–37.4)
MCV RBC AUTO: 92 FL (ref 82–98)
MONOCYTES # BLD AUTO: 0.45 THOUSAND/ÂΜL (ref 0.17–1.22)
MONOCYTES NFR BLD AUTO: 6 % (ref 4–12)
NEUTROPHILS # BLD AUTO: 4.79 THOUSANDS/ÂΜL (ref 1.85–7.62)
NEUTS SEG NFR BLD AUTO: 67 % (ref 43–75)
NRBC BLD AUTO-RTO: 0 /100 WBCS
P AXIS: 59 DEGREES
PLATELET # BLD AUTO: 257 THOUSANDS/UL (ref 149–390)
PMV BLD AUTO: 10.5 FL (ref 8.9–12.7)
POTASSIUM SERPL-SCNC: 3.7 MMOL/L (ref 3.5–5.3)
PR INTERVAL: 188 MS
PROT SERPL-MCNC: 6.6 G/DL (ref 6.4–8.4)
QRS AXIS: 38 DEGREES
QRSD INTERVAL: 82 MS
QT INTERVAL: 376 MS
QTC INTERVAL: 447 MS
RBC # BLD AUTO: 4.41 MILLION/UL (ref 3.81–5.12)
SODIUM SERPL-SCNC: 139 MMOL/L (ref 135–147)
T WAVE AXIS: 41 DEGREES
VENTRICULAR RATE: 85 BPM
WBC # BLD AUTO: 7.28 THOUSAND/UL (ref 4.31–10.16)

## 2025-05-05 PROCEDURE — 84484 ASSAY OF TROPONIN QUANT: CPT

## 2025-05-05 PROCEDURE — 85025 COMPLETE CBC W/AUTO DIFF WBC: CPT

## 2025-05-05 PROCEDURE — 83735 ASSAY OF MAGNESIUM: CPT | Performed by: INTERNAL MEDICINE

## 2025-05-05 PROCEDURE — 70450 CT HEAD/BRAIN W/O DYE: CPT

## 2025-05-05 PROCEDURE — 96361 HYDRATE IV INFUSION ADD-ON: CPT

## 2025-05-05 PROCEDURE — 71046 X-RAY EXAM CHEST 2 VIEWS: CPT

## 2025-05-05 PROCEDURE — 96360 HYDRATION IV INFUSION INIT: CPT

## 2025-05-05 PROCEDURE — 82550 ASSAY OF CK (CPK): CPT | Performed by: INTERNAL MEDICINE

## 2025-05-05 PROCEDURE — 99284 EMERGENCY DEPT VISIT MOD MDM: CPT

## 2025-05-05 PROCEDURE — 93010 ELECTROCARDIOGRAM REPORT: CPT | Performed by: INTERNAL MEDICINE

## 2025-05-05 PROCEDURE — 99285 EMERGENCY DEPT VISIT HI MDM: CPT | Performed by: INTERNAL MEDICINE

## 2025-05-05 PROCEDURE — 36415 COLL VENOUS BLD VENIPUNCTURE: CPT

## 2025-05-05 PROCEDURE — 80053 COMPREHEN METABOLIC PANEL: CPT

## 2025-05-05 PROCEDURE — 93005 ELECTROCARDIOGRAM TRACING: CPT

## 2025-05-05 RX ORDER — MIDAZOLAM HYDROCHLORIDE 1 MG/ML
2 INJECTION INTRAMUSCULAR; INTRAVENOUS ONCE
Status: COMPLETED | OUTPATIENT
Start: 2025-05-05 | End: 2025-05-05

## 2025-05-05 RX ADMIN — SODIUM CHLORIDE 1000 ML: 0.9 INJECTION, SOLUTION INTRAVENOUS at 07:36

## 2025-05-05 NOTE — DISCHARGE INSTRUCTIONS
"Follow-up with your PMD.  Take your medication as prescribed.  Drink plenty of water.  Labs Reviewed   HS TROPONIN I 0HR - Normal       Result Value Ref Range Status    hs TnI 0hr <2  \"Refer to ACS Flowchart\"- see link ng/L Final    Comment:                                              Initial (time 0) result  If >=50 ng/L, Myocardial injury suggested ;  Type of myocardial injury and treatment strategy  to be determined.  If 5-49 ng/L, a delta result at 2 hours and or 4 hours will be needed to further evaluate.  If <4 ng/L, and chest pain has been >3 hours since onset, patient may qualify for discharge based on the HEART score in the ED.  If <5 ng/L and <3hours since onset of chest pain, a delta result at 2 hours will be needed to further evaluate.    HS Troponin 99th Percentile URL of a Health Population=12 ng/L with a 95% Confidence Interval of 8-18 ng/L.    Second Troponin (time 2 hours)  If calculated delta >= 20 ng/L,  Myocardial injury suggested ; Type of myocardial injury and treatment strategy to be determined.  If 5-49 ng/L and the calculated delta is 5-19 ng/L, consult medical service for evaluation.  Continue evaluation for ischemia on ecg and other possible etiology and repeat hs troponin at 4 hours.  If delta is <5 ng/L at 2 hours, consider discharge based on risk stratification via the HEART score (if in ED), or MARII risk score in IP/Observation.    HS Troponin 99th Percentile URL of a Health Population=12 ng/L with a 95% Confidence Interval of 8-18 ng/L.   CK - Normal    Total CK 75  26 - 192 U/L Final   MAGNESIUM - Normal    Magnesium 1.9  1.9 - 2.7 mg/dL Final   CBC AND DIFFERENTIAL    WBC 7.28  4.31 - 10.16 Thousand/uL Final    RBC 4.41  3.81 - 5.12 Million/uL Final    Hemoglobin 13.1  11.5 - 15.4 g/dL Final    Hematocrit 40.4  34.8 - 46.1 % Final    MCV 92  82 - 98 fL Final    MCH 29.7  26.8 - 34.3 pg Final    MCHC 32.4  31.4 - 37.4 g/dL Final    RDW 13.1  11.6 - 15.1 % Final    MPV 10.5  8.9 - " 12.7 fL Final    Platelets 257  149 - 390 Thousands/uL Final    nRBC 0  /100 WBCs Final    Segmented % 67  43 - 75 % Final    Immature Grans % 0  0 - 2 % Final    Lymphocytes % 21  14 - 44 % Final    Monocytes % 6  4 - 12 % Final    Eosinophils Relative 6  0 - 6 % Final    Basophils Relative 0  0 - 1 % Final    Absolute Neutrophils 4.79  1.85 - 7.62 Thousands/µL Final    Absolute Immature Grans 0.02  0.00 - 0.20 Thousand/uL Final    Absolute Lymphocytes 1.55  0.60 - 4.47 Thousands/µL Final    Absolute Monocytes 0.45  0.17 - 1.22 Thousand/µL Final    Eosinophils Absolute 0.44  0.00 - 0.61 Thousand/µL Final    Basophils Absolute 0.03  0.00 - 0.10 Thousands/µL Final   COMPREHENSIVE METABOLIC PANEL    Sodium 139  135 - 147 mmol/L Final    Potassium 3.7  3.5 - 5.3 mmol/L Final    Chloride 108  96 - 108 mmol/L Final    CO2 22  21 - 32 mmol/L Final    ANION GAP 9  4 - 13 mmol/L Final    BUN 11  5 - 25 mg/dL Final    Creatinine 0.76  0.60 - 1.30 mg/dL Final    Comment: Standardized to IDMS reference method    Glucose 99  65 - 140 mg/dL Final    Comment: If the patient is fasting, the ADA then defines impaired fasting glucose as > 100 mg/dL and diabetes as > or equal to 123 mg/dL.    Calcium 8.5  8.4 - 10.2 mg/dL Final    AST 17  13 - 39 U/L Final    ALT 14  7 - 52 U/L Final    Comment: Specimen collection should occur prior to Sulfasalazine administration due to the potential for falsely depressed results.     Alkaline Phosphatase 61  34 - 104 U/L Final    Total Protein 6.6  6.4 - 8.4 g/dL Final    Albumin 3.9  3.5 - 5.0 g/dL Final    Total Bilirubin 0.55  0.20 - 1.00 mg/dL Final    Comment: Use of this assay is not recommended for patients undergoing treatment with eltrombopag due to the potential for falsely elevated results.  N-acetyl-p-benzoquinone imine (metabolite of Acetaminophen) will generate erroneously low results in samples for patients that have taken an overdose of Acetaminophen.    eGFR 89  ml/min/1.73sq m  "Final    Narrative:     National Kid[employer] Disease Foundation guidelines for Chronic Kidney Disease (CKD):     Stage 1 with normal or high GFR (GFR > 90 mL/min/1.73 square meters)    Stage 2 Mild CKD (GFR = 60-89 mL/min/1.73 square meters)    Stage 3A Moderate CKD (GFR = 45-59 mL/min/1.73 square meters)    Stage 3B Moderate CKD (GFR = 30-44 mL/min/1.73 square meters)    Stage 4 Severe CKD (GFR = 15-29 mL/min/1.73 square meters)    Stage 5 End Stage CKD (GFR <15 mL/min/1.73 square meters)  Note: GFR calculation is accurate only with a steady state creatinine   HS TROPONIN I 2HR    hs TnI 2hr <2  \"Refer to ACS Flowchart\"- see link ng/L Final    Comment:                                              Initial (time 0) result  If >=50 ng/L, Myocardial injury suggested ;  Type of myocardial injury and treatment strategy  to be determined.  If 5-49 ng/L, a delta result at 2 hours and or 4 hours will be needed to further evaluate.  If <4 ng/L, and chest pain has been >3 hours since onset, patient may qualify for discharge based on the HEART score in the ED.  If <5 ng/L and <3hours since onset of chest pain, a delta result at 2 hours will be needed to further evaluate.    HS Troponin 99th Percentile URL of a Health Population=12 ng/L with a 95% Confidence Interval of 8-18 ng/L.    Second Troponin (time 2 hours)  If calculated delta >= 20 ng/L,  Myocardial injury suggested ; Type of myocardial injury and treatment strategy to be determined.  If 5-49 ng/L and the calculated delta is 5-19 ng/L, consult medical service for evaluation.  Continue evaluation for ischemia on ecg and other possible etiology and repeat hs troponin at 4 hours.  If delta is <5 ng/L at 2 hours, consider discharge based on risk stratification via the HEART score (if in ED), or MARII risk score in IP/Observation.    HS Troponin 99th Percentile URL of a Health Population=12 ng/L with a 95% Confidence Interval of 8-18 ng/L.    Delta 2hr hsTnI     Final    Comment: " Unable to Calculate   HS TROPONIN I 4HR   LIGHT BLUE TOP     CT head wo contrast   Final Result      No acute intracranial abnormality.                  Workstation performed: [id number]         XR chest 2 views   Final Result      No acute cardiopulmonary disease.            Workstation performed: [id number]

## 2025-05-05 NOTE — ED NOTES
Per ems, patient with witnessed seizure enroute beginning with right arm tremor then extending to full body which subsided after versed given.       Silvia Roa RN  05/05/25 0707       Silvia Roa RN  05/05/25 0745

## 2025-05-05 NOTE — ED PROVIDER NOTES
Time reflects when diagnosis was documented in both MDM as applicable and the Disposition within this note       Time User Action Codes Description Comment    5/5/2025 10:02 AM Reed Cameron Add [R42] Dizziness           ED Disposition       ED Disposition   Discharge    Condition   Stable    Date/Time   Mon May 5, 2025 10:02 AM    Comment   Kristal Corrigan discharge to home/self care.                   Assessment & Plan       Medical Decision Making  This is a 55 years old came for having dizziness and he 3 months and have some chest pain.  Patient stated that she feels like she is going to pass out.  Patient has no headache, SOB, abdominal pain, nausea or vomiting.  Patient  has tremors and EMS and they gave her Versed suspected that she may have seizure.  Patient has no history of seizure disorder.  Patient sitting comfortable at the ER.  Patient has history of A-fib and she is on Eliquis.  Physical exam shows no pertinent positive findings.  Reviewing the labs including CBC, CMP magnesium all are within normal limits.  Troponin 2 and delta troponin2  EKG shows NSR rate 85/min low voltage no ischemic changes.  CXR no acute cardiopulmonary findings.    CT head no acute intracranial findings.  Patient stable condition discharge patient home in stable condition.  Differential diagnosis include but not limited to; BPV, vestibular neuritis, chest pain, dehydration, near syncope, CAD, cardiac arrhythmias      Amount and/or Complexity of Data Reviewed  Labs: ordered.     Details: Reviewing the labs including CBC, CMP magnesium all are within normal limits.    Radiology: ordered.     Details: CXR no acute cardiopulmonary findings.    CT head no acute intracranial findings.    ECG/medicine tests: ordered.     Details: EKG shows NSR rate 85/min low voltage no ischemic changes.      Risk  Prescription drug management.             Medications   midazolam (FOR EMS ONLY) (VERSED) 2 mg/2 mL injection 4 mg (0 mg Does not  "apply Given to EMS 5/5/25 0649)   sodium chloride 0.9 % bolus 1,000 mL (0 mL Intravenous Stopped 5/5/25 0913)       ED Risk Strat Scores                    No data recorded        SBIRT 20yo+      Flowsheet Row Most Recent Value   Initial Alcohol Screen: US AUDIT-C     1. How often do you have a drink containing alcohol? 0 Filed at: 05/05/2025 0654   2. How many drinks containing alcohol do you have on a typical day you are drinking?  0 Filed at: 05/05/2025 0654   3a. Male UNDER 65: How often do you have five or more drinks on one occasion? 0 Filed at: 05/05/2025 0654   3b. FEMALE Any Age, or MALE 65+: How often do you have 4 or more drinks on one occassion? 0 Filed at: 05/05/2025 0654   Audit-C Score 0 Filed at: 05/05/2025 0654   LOUIE: How many times in the past year have you...    Used an illegal drug or used a prescription medication for non-medical reasons? Never Filed at: 05/05/2025 0654                            History of Present Illness       Chief Complaint   Patient presents with    Dizziness     Patient presents to ED with EMS, c/o \"heat wave\", lightheadedness and dizziness at work. Denies LOC, fall, or head strike. Per EMS, patient witnessed having a full body tremor, patient reports hx: tremors to right.       Past Medical History:   Diagnosis Date    Anemia     Aortic aneurysm (HCC)     Basal cell carcinoma     Cancer (HCC)     basal cell skin cancer    Eosinophilic esophagitis     Gastritis     GERD (gastroesophageal reflux disease)     History of breast problem       Past Surgical History:   Procedure Laterality Date    BASAL CELL CARCINOMA EXCISION  2018    Dr Bloch    BASAL CELL CARCINOMA EXCISION Right     Right ear    BREAST BIOPSY      X3    BREAST SURGERY      BREAST SURGERY Right     Right breast lump removal    COLONOSCOPY      MAMMO (HISTORICAL)  2017    Mammo/U/S 11/2017 stable; next mammo 1 year    REVISION OF SCAR  09/2019    Excision of Painful scar Right retroauricular area - Dr. Bloch "    UMBILICAL HERNIA REPAIR        Family History   Problem Relation Age of Onset    Heart disease Mother     Stroke Mother     Lung cancer Father       Social History     Tobacco Use    Smoking status: Never    Smokeless tobacco: Never   Vaping Use    Vaping status: Never Used   Substance Use Topics    Alcohol use: Not Currently    Drug use: Never      E-Cigarette/Vaping    E-Cigarette Use Never User       E-Cigarette/Vaping Substances    Nicotine No     THC No     CBD No     Flavoring No     Other No     Unknown No       I have reviewed and agree with the history as documented.     This is 55 years old he came from work by ambulance as patient she felt dizzy, and she has tremors and has mild chest pain.  Patient feels like she is going to pass out.  On the way to the ER the EMS found her she have tremors and they stated that this could be seizures so they give him Versed.  Patient came to the ER have mild dizziness.  Patient has mild retrosternal chest pain nonradiating.  Chest pain is pressure-like.  Patient has no nausea no vomiting no diaphoresis.  Patient denies history of MI but she has history of A-fib and she is on Eliquis twice daily.  Patient is followed by cardiologist.  Patient has a cardiac monitor.  Patient denies history of seizure.  On the arrival to the ER patient has stable vital signs in no distress.  Patient denies history of smoking or drinking or taking recreational drugs.      History provided by:  Patient   used: No    Dizziness  Quality:  Lightheadedness  Severity:  Moderate  Onset quality:  Sudden  Timing:  Constant  Progression:  Improving  Chronicity:  New  Relieved by:  Nothing  Associated symptoms: chest pain    Associated symptoms: no palpitations, no shortness of breath and no vomiting    Risk factors: anemia        Review of Systems   Constitutional:  Negative for chills and fever.   HENT:  Negative for ear pain and sore throat.    Eyes:  Negative for pain and  visual disturbance.   Respiratory:  Negative for cough and shortness of breath.    Cardiovascular:  Positive for chest pain. Negative for palpitations.   Gastrointestinal:  Negative for abdominal pain and vomiting.   Genitourinary:  Negative for dysuria and hematuria.   Musculoskeletal:  Negative for arthralgias and back pain.   Skin:  Negative for color change and rash.   Neurological:  Positive for dizziness. Negative for seizures and syncope.   All other systems reviewed and are negative.          Objective       ED Triage Vitals [05/05/25 0644]   Temperature Pulse Blood Pressure Respirations SpO2 Patient Position - Orthostatic VS   97.6 °F (36.4 °C) 91 115/70 19 93 % Lying      Temp Source Heart Rate Source BP Location FiO2 (%) Pain Score    Oral Monitor Left arm -- 8      Vitals      Date and Time Temp Pulse SpO2 Resp BP Pain Score FACES Pain Rating User   05/05/25 0830 -- 63 100 % 16 114/72 -- -- MP   05/05/25 0644 97.6 °F (36.4 °C) 91 93 % 19 115/70 8 -- KARLA            Physical Exam  Vitals and nursing note reviewed.   Constitutional:       General: She is not in acute distress.     Appearance: She is well-developed. She is not ill-appearing, toxic-appearing or diaphoretic.   HENT:      Head: Normocephalic and atraumatic.      Right Ear: Ear canal normal.      Left Ear: Ear canal normal.      Nose: Nose normal. No congestion or rhinorrhea.      Mouth/Throat:      Mouth: Mucous membranes are moist.      Pharynx: No oropharyngeal exudate or posterior oropharyngeal erythema.   Eyes:      Extraocular Movements: Extraocular movements intact.      Conjunctiva/sclera: Conjunctivae normal.      Pupils: Pupils are equal, round, and reactive to light.   Neck:      Vascular: No carotid bruit.   Cardiovascular:      Rate and Rhythm: Normal rate and regular rhythm.      Heart sounds: No murmur heard.     No friction rub. No gallop.   Pulmonary:      Effort: Pulmonary effort is normal. No respiratory distress.      Breath  sounds: Normal breath sounds. No wheezing, rhonchi or rales.   Chest:      Chest wall: No tenderness.   Abdominal:      General: Abdomen is flat. There is no distension.      Palpations: Abdomen is soft. There is no mass.      Tenderness: There is no abdominal tenderness. There is no guarding or rebound.      Hernia: No hernia is present.   Musculoskeletal:         General: No swelling, tenderness, deformity or signs of injury.      Cervical back: Normal range of motion and neck supple. No rigidity or tenderness.      Right lower leg: No edema.      Left lower leg: No edema.   Lymphadenopathy:      Cervical: No cervical adenopathy.   Skin:     General: Skin is warm and dry.      Capillary Refill: Capillary refill takes less than 2 seconds.      Coloration: Skin is not jaundiced or pale.      Findings: No bruising, erythema, lesion or rash.   Neurological:      General: No focal deficit present.      Mental Status: She is alert and oriented to person, place, and time.      Cranial Nerves: No cranial nerve deficit.      Sensory: No sensory deficit.      Motor: No weakness.      Coordination: Coordination normal.      Gait: Gait normal.      Deep Tendon Reflexes: Reflexes normal.   Psychiatric:         Mood and Affect: Mood normal.         Behavior: Behavior normal.         Results Reviewed       Procedure Component Value Units Date/Time    HS Troponin I 2hr [[id number]] Collected: 05/05/25 0912    Lab Status: Final result Specimen: Blood from Arm, Right Updated: 05/05/25 0950     hs TnI 2hr <2 ng/L      Delta 2hr hsTnI --    CK [[id number]]  (Normal) Collected: 05/05/25 0711    Lab Status: Final result Specimen: Blood from Arm, Left Updated: 05/05/25 0914     Total CK 75 U/L     Magnesium [[id number]]  (Normal) Collected: 05/05/25 0711    Lab Status: Final result Specimen: Blood from Arm, Left Updated: 05/05/25 0914     Magnesium 1.9 mg/dL     Comprehensive metabolic panel [[id number]] Collected: 05/05/25 0711    Lab  Status: Final result Specimen: Blood from Arm, Left Updated: 05/05/25 0809     Sodium 139 mmol/L      Potassium 3.7 mmol/L      Chloride 108 mmol/L      CO2 22 mmol/L      ANION GAP 9 mmol/L      BUN 11 mg/dL      Creatinine 0.76 mg/dL      Glucose 99 mg/dL      Calcium 8.5 mg/dL      AST 17 U/L      ALT 14 U/L      Alkaline Phosphatase 61 U/L      Total Protein 6.6 g/dL      Albumin 3.9 g/dL      Total Bilirubin 0.55 mg/dL      eGFR 89 ml/min/1.73sq m     Narrative:      [employer] guidelines for Chronic Kidney Disease (CKD):     Stage 1 with normal or high GFR (GFR > 90 mL/min/1.73 square meters)    Stage 2 Mild CKD (GFR = 60-89 mL/min/1.73 square meters)    Stage 3A Moderate CKD (GFR = 45-59 mL/min/1.73 square meters)    Stage 3B Moderate CKD (GFR = 30-44 mL/min/1.73 square meters)    Stage 4 Severe CKD (GFR = 15-29 mL/min/1.73 square meters)    Stage 5 End Stage CKD (GFR <15 mL/min/1.73 square meters)  Note: GFR calculation is accurate only with a steady state creatinine    HS Troponin 0hr (reflex protocol) [[id number]]  (Normal) Collected: 05/05/25 0711    Lab Status: Final result Specimen: Blood from Arm, Left Updated: 05/05/25 0747     hs TnI 0hr <2 ng/L     CBC and differential [[id number]] Collected: 05/05/25 0711    Lab Status: Final result Specimen: Blood from Arm, Left Updated: 05/05/25 0724     WBC 7.28 Thousand/uL      RBC 4.41 Million/uL      Hemoglobin 13.1 g/dL      Hematocrit 40.4 %      MCV 92 fL      MCH 29.7 pg      MCHC 32.4 g/dL      RDW 13.1 %      MPV 10.5 fL      Platelets 257 Thousands/uL      nRBC 0 /100 WBCs      Segmented % 67 %      Immature Grans % 0 %      Lymphocytes % 21 %      Monocytes % 6 %      Eosinophils Relative 6 %      Basophils Relative 0 %      Absolute Neutrophils 4.79 Thousands/µL      Absolute Immature Grans 0.02 Thousand/uL      Absolute Lymphocytes 1.55 Thousands/µL      Absolute Monocytes 0.45 Thousand/µL      Eosinophils Absolute 0.44  Thousand/µL      Basophils Absolute 0.03 Thousands/µL             CT head wo contrast   Final Interpretation by Reese Rosales MD ([id number])      No acute intracranial abnormality.                  Workstation performed: [id number]         XR chest 2 views   Final Interpretation by Reese Rosales MD ([id number])      No acute cardiopulmonary disease.            Workstation performed: [id number]             ECG 12 Lead Documentation Only    Date/Time: 2025 9:56 AM    Performed by: Reed Cameron MD  Authorized by: Reed Cameron MD    Indications / Diagnosis:  Chest pain  ECG reviewed by me, the ED Provider: yes    Patient location:  ED  Previous ECG:     Previous ECG:  Compared to current    Similarity:  Changes noted    Comparison to cardiac monitor: Yes    Interpretation:     Interpretation: abnormal    Rate:     ECG rate:  85    ECG rate assessment: normal    Rhythm:     Rhythm: sinus rhythm    Ectopy:     Ectopy: none    QRS:     QRS axis:  Normal  ST segments:     ST segments:  Normal  T waves:     T waves: normal    Comments:      Low voltage QRS      ED Medication and Procedure Management   Prior to Admission Medications   Prescriptions Last Dose Informant Patient Reported? Taking?   Multiple Vitamin (multivitamin) tablet 2025 Self Yes Yes   Sig: Take 1 tablet by mouth daily   apixaban (Eliquis) 5 mg 2025 Self No Yes   Sig: Take 1 tablet (5 mg total) by mouth 2 (two) times a day   azelastine (ASTELIN) 0.1 % nasal spray 2025 Self Yes Yes   Si-2 sprays into each nostril 2 (two) times a day   ferrous sulfate 325 (65 Fe) mg tablet   Yes No   Sig: Take 1 tablet by mouth daily with breakfast   flecainide (TAMBOCOR) 100 mg tablet 2025 Self No Yes   Sig: Take 1 tablet (100 mg total) by mouth 2 (two) times a day   metoprolol succinate (TOPROL-XL) 25 mg 24 hr tablet 2025 Self No Yes   Sig: Take 1 tablet (25 mg total) by mouth daily   rosuvastatin (CRESTOR) 5 mg tablet  5/4/2025 Self No Yes   Sig: Take 1 tablet (5 mg total) by mouth daily at bedtime      Facility-Administered Medications: None     Discharge Medication List as of 5/5/2025 10:03 AM        CONTINUE these medications which have NOT CHANGED    Details   apixaban (Eliquis) 5 mg Take 1 tablet (5 mg total) by mouth 2 (two) times a day, Starting Wed 2/5/2025, Normal      azelastine (ASTELIN) 0.1 % nasal spray 1-2 sprays into each nostril 2 (two) times a day, Starting Sat 8/3/2024, Historical Med      flecainide (TAMBOCOR) 100 mg tablet Take 1 tablet (100 mg total) by mouth 2 (two) times a day, Starting Wed 2/5/2025, Normal      metoprolol succinate (TOPROL-XL) 25 mg 24 hr tablet Take 1 tablet (25 mg total) by mouth daily, Starting Wed 2/5/2025, Normal      Multiple Vitamin (multivitamin) tablet Take 1 tablet by mouth daily, Historical Med      rosuvastatin (CRESTOR) 5 mg tablet Take 1 tablet (5 mg total) by mouth daily at bedtime, Starting Wed 2/5/2025, Normal      ferrous sulfate 325 (65 Fe) mg tablet Take 1 tablet by mouth daily with breakfast, Starting Tue 8/22/2023, Until Wed 4/23/2025, Historical Med           No discharge procedures on file.  ED SEPSIS DOCUMENTATION   Time reflects when diagnosis was documented in both MDM as applicable and the Disposition within this note       Time User Action Codes Description Comment    5/5/2025 10:02 AM Reed Cameron Add [R42] Dizziness                  Reed Cameron MD  05/07/25 4774

## 2025-05-05 NOTE — TELEPHONE ENCOUNTER
Caller: Kristal Corrigan    Doctor: Dr. Ponce    Reason for call: Patient was seen in ED 5/5/2025 for Dizziness. Had an EKG done and she concerned over mentioned diagnosis of pericarditis. Would like to further discuss.    Call back#: [phone]

## 2025-05-05 NOTE — ED NOTES
Pt ambulated to the bathroom denies feeling nauseous or dizzy      Marisol Hills RN  05/05/25 0843

## 2025-05-07 ENCOUNTER — TELEPHONE (OUTPATIENT)
Age: 55
End: 2025-05-07

## 2025-05-07 NOTE — TELEPHONE ENCOUNTER
Called pt and left message that Dr. Ponce reviewed her EKG and did not see any clear evidence of pericarditis. It she is only experiencing dizziness and no chest pain, Dr. Ponce stated she shouldn't be worried. Left office call back number if pt has questions or would like to schedule an earlier follow up visit.

## 2025-05-23 ENCOUNTER — OFFICE VISIT (OUTPATIENT)
Dept: CARDIOLOGY CLINIC | Facility: CLINIC | Age: 55
End: 2025-05-23

## 2025-05-23 VITALS
TEMPERATURE: 97.1 F | BODY MASS INDEX: 32.07 KG/M2 | HEART RATE: 63 BPM | SYSTOLIC BLOOD PRESSURE: 112 MMHG | HEIGHT: 63 IN | OXYGEN SATURATION: 99 % | DIASTOLIC BLOOD PRESSURE: 66 MMHG | WEIGHT: 181 LBS

## 2025-05-23 DIAGNOSIS — I48.0 PAROXYSMAL ATRIAL FIBRILLATION (HCC): ICD-10-CM

## 2025-05-23 DIAGNOSIS — E78.00 PURE HYPERCHOLESTEROLEMIA: ICD-10-CM

## 2025-05-23 DIAGNOSIS — E66.09 CLASS 1 OBESITY DUE TO EXCESS CALORIES WITH SERIOUS COMORBIDITY AND BODY MASS INDEX (BMI) OF 30.0 TO 30.9 IN ADULT: ICD-10-CM

## 2025-05-23 DIAGNOSIS — E66.811 CLASS 1 OBESITY DUE TO EXCESS CALORIES WITH SERIOUS COMORBIDITY AND BODY MASS INDEX (BMI) OF 30.0 TO 30.9 IN ADULT: ICD-10-CM

## 2025-05-23 DIAGNOSIS — R07.89 CHEST TIGHTNESS: Primary | ICD-10-CM

## 2025-05-23 RX ORDER — CARVEDILOL 3.12 MG/1
3.12 TABLET ORAL 2 TIMES DAILY WITH MEALS
Qty: 60 TABLET | Refills: 3 | Status: SHIPPED | OUTPATIENT
Start: 2025-05-23
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
## 2025-06-06 ENCOUNTER — HOSPITAL ENCOUNTER (OUTPATIENT)
Dept: CT IMAGING | Facility: HOSPITAL | Age: 55
End: 2025-06-06
Attending: INTERNAL MEDICINE
Payer: COMMERCIAL

## 2025-06-06 VITALS — HEART RATE: 64 BPM | DIASTOLIC BLOOD PRESSURE: 67 MMHG | SYSTOLIC BLOOD PRESSURE: 106 MMHG

## 2025-06-06 DIAGNOSIS — R07.89 CHEST TIGHTNESS: ICD-10-CM

## 2025-06-06 DIAGNOSIS — I48.0 PAROXYSMAL ATRIAL FIBRILLATION (HCC): ICD-10-CM

## 2025-06-06 PROCEDURE — 75574 CT ANGIO HRT W/3D IMAGE: CPT

## 2025-06-06 PROCEDURE — 75580 N-INVAS EST C FFR SW ALY CTA: CPT

## 2025-06-06 RX ORDER — METOPROLOL TARTRATE 1 MG/ML
5 INJECTION, SOLUTION INTRAVENOUS
Status: DISCONTINUED | OUTPATIENT
Start: 2025-06-06 | End: 2025-06-10 | Stop reason: HOSPADM

## 2025-06-06 RX ORDER — NITROGLYCERIN 0.4 MG/1
0.8 TABLET SUBLINGUAL ONCE
Status: COMPLETED | OUTPATIENT
Start: 2025-06-06 | End: 2025-06-06

## 2025-06-06 RX ADMIN — NITROGLYCERIN 0.8 MG: 0.4 TABLET SUBLINGUAL at 07:51

## 2025-06-06 RX ADMIN — IOHEXOL 68 ML: 350 INJECTION, SOLUTION INTRAVENOUS at 08:00

## 2025-06-06 NOTE — NURSING NOTE
Patient arrived for coronary CT angiography. Test education reviewed with patient. Medications and allergies verified. Pt denies PDE5 inhibitor use. Patient took extra dose of coreg as instructed by cardiology. SL nitro given per protocol. See vitals flowsheet. Tolerated test well. Instructed to increase fluid intake remainder of day. Vitals stable, patient asymptomatic upon departure with spouse.

## 2025-06-09 ENCOUNTER — RESULTS FOLLOW-UP (OUTPATIENT)
Dept: CARDIOLOGY CLINIC | Facility: CLINIC | Age: 55
End: 2025-06-09

## 2025-07-22 ENCOUNTER — OFFICE VISIT (OUTPATIENT)
Dept: CARDIOLOGY CLINIC | Facility: CLINIC | Age: 55
End: 2025-07-22
Payer: COMMERCIAL

## 2025-07-22 VITALS
HEIGHT: 63 IN | SYSTOLIC BLOOD PRESSURE: 122 MMHG | WEIGHT: 180 LBS | BODY MASS INDEX: 31.89 KG/M2 | TEMPERATURE: 97.2 F | DIASTOLIC BLOOD PRESSURE: 80 MMHG | HEART RATE: 76 BPM | OXYGEN SATURATION: 98 %

## 2025-07-22 DIAGNOSIS — I48.0 PAROXYSMAL ATRIAL FIBRILLATION (HCC): Primary | ICD-10-CM

## 2025-07-22 DIAGNOSIS — E66.09 CLASS 1 OBESITY DUE TO EXCESS CALORIES WITH SERIOUS COMORBIDITY AND BODY MASS INDEX (BMI) OF 30.0 TO 30.9 IN ADULT: ICD-10-CM

## 2025-07-22 DIAGNOSIS — E66.811 CLASS 1 OBESITY DUE TO EXCESS CALORIES WITH SERIOUS COMORBIDITY AND BODY MASS INDEX (BMI) OF 30.0 TO 30.9 IN ADULT: ICD-10-CM

## 2025-07-22 DIAGNOSIS — E78.00 PURE HYPERCHOLESTEROLEMIA: ICD-10-CM

## 2025-07-22 DIAGNOSIS — R07.89 CHEST TIGHTNESS: ICD-10-CM

## 2025-07-22 DIAGNOSIS — R00.2 PALPITATIONS: ICD-10-CM

## 2025-07-22 PROCEDURE — 99214 OFFICE O/P EST MOD 30 MIN: CPT | Performed by: INTERNAL MEDICINE

## 2025-07-22 RX ORDER — CARVEDILOL 3.12 MG/1
3.12 TABLET ORAL 2 TIMES DAILY WITH MEALS
Qty: 180 TABLET | Refills: 3 | Status: SHIPPED | OUTPATIENT
Start: 2025-07-22

## 2025-07-22 NOTE — ASSESSMENT & PLAN NOTE
10/2024 - started on rosuvastatin  Tolerating rosuvastatin without problems, lipids have improved overall  Continue rosuvastatin 5 mg daily

## 2025-07-22 NOTE — ASSESSMENT & PLAN NOTE
Overview  11/2023: First known episode of A-fib   Treated with metoprolol and Eliquis 10/2024: remains NSR on ECG today  10/2024: OV: Chest tightness and palpitations   recommended echo, zio-patch  10/21/24 ED visit - with chest tightness/dizziness/near syncope  11/2024: Zio-XT (10/22/24 - 11/5/24)  Single Afib episode, 4% A-fib burden, longest episode lasting 13 hours 48 minutes, symptomatic to patient  I initially requested increasing metoprolol to 25 g twice daily, but she told us that her metoprolol was recently decreased at the hospitalization to 12.5 mg twice daily --> so I asked to increase back to 25 mg bid instead  12/2024: No major palpitations or dizziness, blood pressure borderline at 100/70  Symptoms on zio-seem to correlate with Afib  Will check nuclear stress test to rule out ischemia before initiating flecainide  1/2025: Continues to have intermittent episodes of chest tightness at random times lasting for 5 to 10 minutes.  Nuclear stress test was negative for ischemia  5/2025: No major palpitations, doing well on flecainide.  Recent Zio patch did not reveal any further A-fib  7/2025: No chest pain, palpitations or any known clinical A-fib recurrence in recent months  Impression  Intermittent episodes of symptomatic atrial fibrillation, which may be the cause of her chest tightness and shortness of breath as well as dizziness  Doing well on flecainide  Plan  Continue flecainide 100 mg twice daily  Continue carvedilol 3.125 mg twice daily  Continue Eliquis 5 mg twice daily
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
## 2025-08-01 DIAGNOSIS — I48.0 PAROXYSMAL ATRIAL FIBRILLATION (HCC): ICD-10-CM

## 2025-08-01 RX ORDER — FLECAINIDE ACETATE 100 MG/1
100 TABLET ORAL 2 TIMES DAILY
Qty: 180 TABLET | Refills: 1 | Status: SHIPPED | OUTPATIENT
Start: 2025-08-01